# Patient Record
Sex: FEMALE | Race: WHITE | NOT HISPANIC OR LATINO | Employment: FULL TIME | URBAN - METROPOLITAN AREA
[De-identification: names, ages, dates, MRNs, and addresses within clinical notes are randomized per-mention and may not be internally consistent; named-entity substitution may affect disease eponyms.]

---

## 2017-01-18 ENCOUNTER — GENERIC CONVERSION - ENCOUNTER (OUTPATIENT)
Dept: OTHER | Facility: OTHER | Age: 57
End: 2017-01-18

## 2017-02-01 ENCOUNTER — GENERIC CONVERSION - ENCOUNTER (OUTPATIENT)
Dept: OTHER | Facility: OTHER | Age: 57
End: 2017-02-01

## 2017-03-02 ENCOUNTER — ALLSCRIPTS OFFICE VISIT (OUTPATIENT)
Dept: OTHER | Facility: OTHER | Age: 57
End: 2017-03-02

## 2017-03-29 ENCOUNTER — APPOINTMENT (EMERGENCY)
Dept: CT IMAGING | Facility: HOSPITAL | Age: 57
End: 2017-03-29
Payer: COMMERCIAL

## 2017-03-29 ENCOUNTER — HOSPITAL ENCOUNTER (EMERGENCY)
Facility: HOSPITAL | Age: 57
Discharge: HOME | End: 2017-03-29
Admitting: EMERGENCY MEDICINE
Payer: COMMERCIAL

## 2017-03-29 VITALS
HEART RATE: 89 BPM | OXYGEN SATURATION: 98 % | DIASTOLIC BLOOD PRESSURE: 74 MMHG | RESPIRATION RATE: 18 BRPM | SYSTOLIC BLOOD PRESSURE: 134 MMHG | TEMPERATURE: 100.4 F | WEIGHT: 150 LBS

## 2017-03-29 DIAGNOSIS — R11.0 NAUSEA: ICD-10-CM

## 2017-03-29 DIAGNOSIS — R19.7 DIARRHEA: Primary | ICD-10-CM

## 2017-03-29 DIAGNOSIS — R10.9 ABDOMINAL PAIN: ICD-10-CM

## 2017-03-29 LAB
ALBUMIN SERPL BCP-MCNC: 3.6 G/DL (ref 3.5–5)
ALP SERPL-CCNC: 71 U/L (ref 46–116)
ALT SERPL W P-5'-P-CCNC: 24 U/L (ref 12–78)
ANION GAP SERPL CALCULATED.3IONS-SCNC: 9 MMOL/L (ref 4–13)
AST SERPL W P-5'-P-CCNC: 14 U/L (ref 5–45)
BACTERIA UR QL AUTO: ABNORMAL /HPF
BASOPHILS # BLD AUTO: 0.03 THOUSANDS/ΜL (ref 0–0.1)
BASOPHILS NFR BLD AUTO: 0 % (ref 0–1)
BILIRUB SERPL-MCNC: 0.76 MG/DL (ref 0.2–1)
BILIRUB UR QL STRIP: NEGATIVE
BUN SERPL-MCNC: 11 MG/DL (ref 5–25)
CALCIUM SERPL-MCNC: 8.4 MG/DL (ref 8.3–10.1)
CHLORIDE SERPL-SCNC: 103 MMOL/L (ref 100–108)
CLARITY UR: CLEAR
CO2 SERPL-SCNC: 28 MMOL/L (ref 21–32)
COLOR UR: YELLOW
CREAT SERPL-MCNC: 0.78 MG/DL (ref 0.6–1.3)
EOSINOPHIL # BLD AUTO: 0.18 THOUSAND/ΜL (ref 0–0.61)
EOSINOPHIL NFR BLD AUTO: 2 % (ref 0–6)
ERYTHROCYTE [DISTWIDTH] IN BLOOD BY AUTOMATED COUNT: 12.9 % (ref 11.6–15.1)
GFR SERPL CREATININE-BSD FRML MDRD: >60 ML/MIN/1.73SQ M
GLUCOSE SERPL-MCNC: 91 MG/DL (ref 65–140)
GLUCOSE UR STRIP-MCNC: NEGATIVE MG/DL
HCG UR QL: NEGATIVE
HCT VFR BLD AUTO: 38.3 % (ref 34.8–46.1)
HGB BLD-MCNC: 13 G/DL (ref 11.5–15.4)
HGB UR QL STRIP.AUTO: ABNORMAL
KETONES UR STRIP-MCNC: NEGATIVE MG/DL
LACTATE SERPL-SCNC: 0.7 MMOL/L (ref 0.5–2)
LEUKOCYTE ESTERASE UR QL STRIP: NEGATIVE
LIPASE SERPL-CCNC: 47 U/L (ref 73–393)
LYMPHOCYTES # BLD AUTO: 1.9 THOUSANDS/ΜL (ref 0.6–4.47)
LYMPHOCYTES NFR BLD AUTO: 16 % (ref 14–44)
MCH RBC QN AUTO: 31.1 PG (ref 26.8–34.3)
MCHC RBC AUTO-ENTMCNC: 33.9 G/DL (ref 31.4–37.4)
MCV RBC AUTO: 92 FL (ref 82–98)
MONOCYTES # BLD AUTO: 1.08 THOUSAND/ΜL (ref 0.17–1.22)
MONOCYTES NFR BLD AUTO: 9 % (ref 4–12)
NEUTROPHILS # BLD AUTO: 8.7 THOUSANDS/ΜL (ref 1.85–7.62)
NEUTS SEG NFR BLD AUTO: 73 % (ref 43–75)
NITRITE UR QL STRIP: NEGATIVE
NON-SQ EPI CELLS URNS QL MICRO: ABNORMAL /HPF
NRBC BLD AUTO-RTO: 0 /100 WBCS
PH UR STRIP.AUTO: 5.5 [PH] (ref 4.5–8)
PLATELET # BLD AUTO: 281 THOUSANDS/UL (ref 149–390)
PMV BLD AUTO: 11.2 FL (ref 8.9–12.7)
POTASSIUM SERPL-SCNC: 3.7 MMOL/L (ref 3.5–5.3)
PROT SERPL-MCNC: 6.9 G/DL (ref 6.4–8.2)
PROT UR STRIP-MCNC: NEGATIVE MG/DL
RBC # BLD AUTO: 4.18 MILLION/UL (ref 3.81–5.12)
RBC #/AREA URNS AUTO: ABNORMAL /HPF
SODIUM SERPL-SCNC: 140 MMOL/L (ref 136–145)
SP GR UR STRIP.AUTO: 1.01 (ref 1–1.03)
UROBILINOGEN UR QL STRIP.AUTO: 0.2 E.U./DL
WBC # BLD AUTO: 11.89 THOUSAND/UL (ref 4.31–10.16)
WBC #/AREA URNS AUTO: ABNORMAL /HPF

## 2017-03-29 PROCEDURE — 96361 HYDRATE IV INFUSION ADD-ON: CPT

## 2017-03-29 PROCEDURE — 81002 URINALYSIS NONAUTO W/O SCOPE: CPT

## 2017-03-29 PROCEDURE — 85025 COMPLETE CBC W/AUTO DIFF WBC: CPT | Performed by: PHYSICIAN ASSISTANT

## 2017-03-29 PROCEDURE — 87086 URINE CULTURE/COLONY COUNT: CPT

## 2017-03-29 PROCEDURE — 36415 COLL VENOUS BLD VENIPUNCTURE: CPT | Performed by: PHYSICIAN ASSISTANT

## 2017-03-29 PROCEDURE — 80053 COMPREHEN METABOLIC PANEL: CPT | Performed by: PHYSICIAN ASSISTANT

## 2017-03-29 PROCEDURE — 83605 ASSAY OF LACTIC ACID: CPT | Performed by: PHYSICIAN ASSISTANT

## 2017-03-29 PROCEDURE — 83690 ASSAY OF LIPASE: CPT | Performed by: PHYSICIAN ASSISTANT

## 2017-03-29 PROCEDURE — 74177 CT ABD & PELVIS W/CONTRAST: CPT

## 2017-03-29 PROCEDURE — 99284 EMERGENCY DEPT VISIT MOD MDM: CPT

## 2017-03-29 PROCEDURE — 81025 URINE PREGNANCY TEST: CPT

## 2017-03-29 PROCEDURE — 81001 URINALYSIS AUTO W/SCOPE: CPT

## 2017-03-29 PROCEDURE — 96374 THER/PROPH/DIAG INJ IV PUSH: CPT

## 2017-03-29 RX ORDER — DICYCLOMINE HCL 20 MG
20 TABLET ORAL ONCE
Status: COMPLETED | OUTPATIENT
Start: 2017-03-29 | End: 2017-03-29

## 2017-03-29 RX ORDER — DICYCLOMINE HCL 20 MG
20 TABLET ORAL EVERY 6 HOURS
Qty: 30 TABLET | Refills: 0 | Status: SHIPPED | OUTPATIENT
Start: 2017-03-29 | End: 2018-11-09 | Stop reason: HOSPADM

## 2017-03-29 RX ORDER — ONDANSETRON 4 MG/1
4 TABLET, ORALLY DISINTEGRATING ORAL EVERY 8 HOURS PRN
Qty: 15 TABLET | Refills: 0 | Status: SHIPPED | OUTPATIENT
Start: 2017-03-29 | End: 2018-11-09 | Stop reason: HOSPADM

## 2017-03-29 RX ORDER — ONDANSETRON 2 MG/ML
4 INJECTION INTRAMUSCULAR; INTRAVENOUS ONCE
Status: COMPLETED | OUTPATIENT
Start: 2017-03-29 | End: 2017-03-29

## 2017-03-29 RX ADMIN — SODIUM CHLORIDE 1000 ML: 0.9 INJECTION, SOLUTION INTRAVENOUS at 19:06

## 2017-03-29 RX ADMIN — IOHEXOL 100 ML: 350 INJECTION, SOLUTION INTRAVENOUS at 19:57

## 2017-03-29 RX ADMIN — ONDANSETRON 4 MG: 2 INJECTION INTRAMUSCULAR; INTRAVENOUS at 19:07

## 2017-03-29 RX ADMIN — DICYCLOMINE HYDROCHLORIDE 20 MG: 20 TABLET ORAL at 19:03

## 2017-03-30 LAB — BACTERIA UR CULT: NORMAL

## 2017-04-24 ENCOUNTER — GENERIC CONVERSION - ENCOUNTER (OUTPATIENT)
Dept: OTHER | Facility: OTHER | Age: 57
End: 2017-04-24

## 2018-01-10 NOTE — RESULT NOTES
Verified Results  CT ABDOMEN W CONTRAST [id number] 45:62II Andrzej Stevens     Test Name Result Flag Reference   CT ABDOMEN W CONTRAST (Report)     CT ABDOMEN WITH IV CONTRAST     INDICATION: Nausea  Upper abdominal pain and bloating, mainly after eating  COMPARISON: 3/1/2005  TECHNIQUE: CT examination of the abdomen was performed  This examination, like all CT scans performed in the [facility], was performed utilizing techniques to minimize radiation dose exposure, including the use of iterative    reconstruction and automated exposure control  Axial, sagittal, and coronal reformatted images were submitted for interpretation  100 ml of Omnipaque 350 was injected intravenously  Enteric contrast was administered  FINDINGS:     ABDOMEN     LOWER CHEST: No significant abnormality in the lung bases  Unchanged tiny subpleural density laterally at the left base noted on image 2/12, which is benign given stability over a nearly 11 year interval      LIVER/BILIARY TREE: Unremarkable  GALLBLADDER: No calcified gallstones  No pericholecystic inflammatory change  SPLEEN: Unremarkable  PANCREAS: Unremarkable  ADRENAL GLANDS: Unremarkable  KIDNEYS/URETERS: Unremarkable  No hydronephrosis  VISUALIZED STOMACH AND BOWEL: Unremarkable  ABDOMINAL CAVITY: No ascites or free intraperitoneal air  No lymphadenopathy  VESSELS: Unremarkable for patient's age  ABDOMINAL WALL: Unremarkable  OSSEOUS STRUCTURES: No acute fracture or destructive osseous lesion  IMPRESSION:     Normal abdomen  Workstation performed: [id number]     Signed by:   Fidelia Osborn MD   4/29/16     (1) CBC/PLT/DIFF [id number] 60:57XU Andrzej Stevens     Test Name Result Flag Reference   WBC COUNT 7 90 Thousand/uL  4 31-10 16   RBC COUNT 4 31 Million/uL  3 81-5 12   HEMOGLOBIN 13 3 g/dL  11 5-15 4   HEMATOCRIT 39 7 %  34 8-46  1   MCV 92 fL  82-98   MCH 30 9 pg  26 8-34 3   MCHC 33 5 g/dL 31 4-37 4   RDW 13 5 %  11 6-15 1   MPV 12 2 fL  8 9-12 7   PLATELET COUNT 758 Thousands/uL  149-390   nRBC AUTOMATED 0 /100 WBCs     NEUTROPHILS RELATIVE PERCENT 60 %  43-75   LYMPHOCYTES RELATIVE PERCENT 30 %  14-44   MONOCYTES RELATIVE PERCENT 8 %  4-12   EOSINOPHILS RELATIVE PERCENT 1 %  0-6   BASOPHILS RELATIVE PERCENT 1 %  0-1   NEUTROPHILS ABSOLUTE COUNT 4 81 Thousands/µL  1 85-7 62   LYMPHOCYTES ABSOLUTE COUNT 2 33 Thousands/µL  0 60-4 47   MONOCYTES ABSOLUTE COUNT 0 60 Thousand/µL  0 17-1 22   EOSINOPHILS ABSOLUTE COUNT 0 10 Thousand/µL  0 00-0 61   BASOPHILS ABSOLUTE COUNT 0 04 Thousands/µL  0 00-0 10     (1) T3 TOTAL [id number] 08:32WU Razumez     Test Name Result Flag Reference   T3 1 0 ng/mL  0 6-1 8     (1) VITAMIN D 25-HYDROXY [id number] 86:70QQ Singularunz     Test Name Result Flag Reference   VIT D 25-HYDROX 33 1 ng/mL  30 0-100 0     (1) T4, FREE [id number] 16:88QY Singularunz     Test Name Result Flag Reference   T4,FREE 0 77 ng/dL  0 76-1 46     (1) COMPREHENSIVE METABOLIC PANEL [id number] 83:22HP Joya Heaton Kidney Disease Education Program recommendations are as follows:  GFR calculation is accurate only with a steady state creatinine  Chronic Kidney disease less than 60 ml/min/1 73 sq  meters  Kidney failure less than 15 ml/min/1 73 sq  meters  Test Name Result Flag Reference   GLUCOSE,RANDM 94 mg/dL     If the patient is fasting, the ADA then defines impaired fasting glucose as > 100 mg/dL and diabetes as > or equal to 123 mg/dL     SODIUM 137 mmol/L  136-145   POTASSIUM 3 7 mmol/L  3 5-5 3   CHLORIDE 103 mmol/L  100-108   CARBON DIOXIDE 28 mmol/L  21-32   ANION GAP (CALC) 6 mmol/L  4-13   BLOOD UREA NITROGEN 15 mg/dL  5-25   CREATININE 0 69 mg/dL  0 60-1 30   Standardized to IDMS reference method   CALCIUM 8 4 mg/dL  8 3-10 1   BILI, TOTAL 0 65 mg/dL  0 20-1 00   ALK PHOSPHATAS 84 U/L     ALT (SGPT) 34 U/L  12-78   AST(SGOT) 28 U/L  5-45   ALBUMIN 4 0 g/dL  3 5-5 0   TOTAL PROTEIN 7 5 g/dL  6 4-8 2   eGFR Non-African American      >60 0 ml/min/1 73sq m     (1) VITAMIN B12 [id number] 62:92FR Jose Farrell     Test Name Result Flag Reference   VITAMIN B12 415 pg/mL  100-900     (1) TSH [id number] 20:39GA Pryblick, Maryam Mis   Patients undergoing fluorescein dye angiography may retain small amounts of fluorescein in the body for 48-72 hours post procedure  Samples containing fluorescein can produce falsely depressed TSH values  If the patient had this procedure,a specimen should be resubmitted post fluorescein clearance  The recommended reference ranges for TSH during pregnancy are as follows:  First trimester 0 1 to 2 5 uIU/mL  Second trimester  0 2 to 3 0 uIU/mL  Third trimester 0 3 to 3 0 uIU/m     Test Name Result Flag Reference   TSH 2 810 uIU/mL  0 358-3 740     (1) VONNIE SCREEN W/REFLEX TO TITER/PATTERN [id number] 28:22BI Jose Farrell     Test Name Result Flag Reference   VONNIE SCREEN   Negative  Negative     (1) T3 UPTAKE [id number] 10:47AB Jose Farrell   Performed at:  [facility] [address]  [id number]  : Neema Arnold MD, Phone:  [phone]     Test Name Result Flag Reference   T3 UPTAKE 25 %  24 - 39

## 2018-01-13 VITALS
WEIGHT: 153 LBS | SYSTOLIC BLOOD PRESSURE: 114 MMHG | BODY MASS INDEX: 24.01 KG/M2 | TEMPERATURE: 97.9 F | HEIGHT: 67 IN | DIASTOLIC BLOOD PRESSURE: 74 MMHG

## 2018-01-13 NOTE — PROGRESS NOTES
Assessment    1  Acute sinusitis (461 9) (J01 90)   2  Allergic rhinitis (477 9) (J30 9)   3  Cough (786 2) (R05)    Plan  Acute sinusitis, Allergic rhinitis, Cough    · Follow-up PRN Evaluation and Treatment  Follow-up  Status: Complete  Done:  [id number] 02:27PM  Acute sinusitis, Cough    · Azithromycin 500 MG Oral Tablet; TAKE 1 TABLET DAILY UNTIL FINISHED    Discussion/Summary    Mucinex D, Fluticasone and Claritin prn  Call if worse  The patient was counseled regarding  Possible side effects of new medications were reviewed with the patient/guardian today  The treatment plan was reviewed with the patient/guardian  The patient/guardian understands and agrees with the treatment plan      Chief Complaint  PT is here today for a cough and sinus pressure in her left cheek bone into her jaw  Says she had it for a couple days  Also has rattling in her chest       History of Present Illness  HPI: PT is here today for a cough and sinus pressure in her left cheek bone into her jaw  Says she had it for a couple days  Also has rattling in her chest       Review of Systems    Constitutional: No fever, no chills, feels well, no tiredness, no recent weight gain or loss  ENT: as noted in HPI  Cardiovascular: no complaints of slow or fast heart rate, no chest pain, no palpitations, no leg claudication or lower extremity edema  Respiratory: as noted in HPI  Breasts: no complaints of breast pain, breast lump or nipple discharge  Gastrointestinal: no complaints of abdominal pain, no constipation, no nausea or diarrhea, no vomiting, no bloody stools  Genitourinary: no complaints of dysuria, no incontinence, no pelvic pain, no dysmenorrhea, no vaginal discharge or abnormal vaginal bleeding  Musculoskeletal: no complaints of arthralgia, no myalgia, no joint swelling or stiffness, no limb pain or swelling  Integumentary: no complaints of skin rash or lesion, no itching or dry skin, no skin wounds     Neurological: no complaints of headache, no confusion, no numbness or tingling, no dizziness or fainting  Active Problems    1  Acute sinusitis (461 9) (J01 90)   2  Allergic rhinitis (477 9) (J30 9)   3  Arrhythmia (427 9) (I49 9)   4  Blood pressure elevated (796 2) (I10)   5  Colon cancer screening (V76 51) (Z12 11)   6  Dyslipidemia (272 4) (E78 5)   7  Engages in travel abroad (V49 89) (Z78 9)   8  Esophageal reflux (530 81) (K21 9)   9  History of allergy (V15 09) (Z88 9)   10  Hyperkalemia (276 7) (E87 5)   11  Hypertension (401 9) (I10)   12  Low back pain (724 2) (M54 5)   13  Lumbar radiculopathy (724 4) (M54 16)   14  Osteoarthritis (715 90) (M19 90)   15  Pulmonary embolism (415 19) (I26 99)   16  Supraventricular tachycardia (427 89) (I47 1)   17  Travelers' diarrhea (009 2) (A09)   18  Vitamin D deficiency (268 9) (E55 9)    Past Medical History    1  History of Acute Erosive Gastritis (535 40)   2  History of Blepharitis of left eye (373 00) (H01 006)   3  History of Cough (786 2) (R05)   4  History of nausea (V12 79) (Z87 898)   5  History of sinusitis (V12 69) (Z87 09)   6  History of upper respiratory infection (V12 09) (Z87 09)   7  History of Sore throat (462) (J02 9)    Family History    1  Family history of Essential Hypertension    2  Family history of Esophageal Cancer (V16 0)   3  Family history of Essential Hypertension   4  Family history of Stroke Syndrome (V17 1)    5  Family history of Benign Polyps Of The Large Intestine (V18 51)    Social History    · Being A Social Drinker   · Engages in travel abroad (V49 89) (Z78 9)   · Never A Smoker    Surgical History    1  History of  Section   2  History of Complete Colonoscopy   3  History of Complete Colonoscopy   4  History of Diagnostic Esophagogastroduodenoscopy   5  History of Venous Ligation With Stripping    Current Meds   1  Aspirin 81 MG Oral Tablet; Therapy: (Recorded:39Hsy6787) to Recorded   2   Fluticasone Propionate 50 MCG/ACT Nasal Suspension; USE 2 SPRAYS IN EACH   NOSTRIL ONCE DAILY; Therapy: 00XNH9095 to (Jimmy Atkinson)  Requested for: 43ENK8910; Last   Rx:04Jan2016 Ordered   3  Multiple Vitamins Oral Tablet; TAKE 1 TABLET DAILY; Therapy: [id number] to Recorded   4  Vitamin D3 TABS; Therapy: (Recorded:31Ita2422) to Recorded    Allergies    1  Penicillins   2  promethazine    Vitals   Recorded: 26UMJ9826 97:61VN   Systolic 410   Diastolic 80   Weight 480 lb 6 08 oz     Physical Exam    Constitutional   General appearance: No acute distress, well appearing and well nourished  Eyes   Conjunctiva and lids: No swelling, erythema or discharge  Pupils and irises: Equal, round and reactive to light  Ears, Nose, Mouth, and Throat   External inspection of ears and nose: Normal     Otoscopic examination: Tympanic membranes translucent with normal light reflex  Canals patent without erythema  Nasal mucosa, septum, and turbinates: Abnormal   sinus congestion  Oropharynx: Abnormal   pnd    Pulmonary   Respiratory effort: No increased work of breathing or signs of respiratory distress  Auscultation of lungs: Abnormal   cough  Cardiovascular   Palpation of heart: Normal PMI, no thrills  Auscultation of heart: Normal rate and rhythm, normal S1 and S2, without murmurs  Examination of extremities for edema and/or varicosities: Normal     Carotid pulses: Normal     Abdomen   Abdomen: Non-tender, no masses  Liver and spleen: No hepatomegaly or splenomegaly  Lymphatic   Palpation of lymph nodes in neck: No lymphadenopathy  Musculoskeletal   Gait and station: Normal     Digits and nails: Normal without clubbing or cyanosis  Inspection/palpation of joints, bones, and muscles: Normal     Skin   Skin and subcutaneous tissue: Normal without rashes or lesions  Neurologic   Cranial nerves: Cranial nerves 2-12 intact  Reflexes: 2+ and symmetric  Sensation: No sensory loss      Psychiatric   Orientation to person, place, and time: Normal     Mood and affect: Normal          Signatures   Electronically signed by : Chun Kenney DO; Feb 1 2016  2:28PM EST                       (Author)

## 2018-01-19 ENCOUNTER — GENERIC CONVERSION - ENCOUNTER (OUTPATIENT)
Dept: OTHER | Facility: OTHER | Age: 58
End: 2018-01-19

## 2018-01-19 DIAGNOSIS — M25.561 PAIN IN RIGHT KNEE: ICD-10-CM

## 2018-01-19 DIAGNOSIS — M25.50 PAIN IN JOINT: ICD-10-CM

## 2018-01-19 DIAGNOSIS — M25.469 EFFUSION OF KNEE: ICD-10-CM

## 2018-01-20 LAB
A/G RATIO (HISTORICAL): 1.7 (ref 1.2–2.2)
ALBUMIN SERPL BCP-MCNC: 4.7 G/DL (ref 3.5–5.5)
ALP SERPL-CCNC: 92 IU/L (ref 39–117)
ALT SERPL W P-5'-P-CCNC: 24 IU/L (ref 0–32)
ANTINUCLEAR ANTIBODIES, IFA (HISTORICAL): NEGATIVE
AST SERPL W P-5'-P-CCNC: 20 IU/L (ref 0–40)
BASOPHILS # BLD AUTO: 0.1 X10E3/UL (ref 0–0.2)
BASOPHILS # BLD AUTO: 1 %
BILIRUB SERPL-MCNC: 0.4 MG/DL (ref 0–1.2)
BUN SERPL-MCNC: 17 MG/DL (ref 6–24)
BUN/CREA RATIO (HISTORICAL): 23 (ref 9–23)
CALCIUM SERPL-MCNC: 9.7 MG/DL (ref 8.7–10.2)
CHLORIDE SERPL-SCNC: 98 MMOL/L (ref 96–106)
CO2 SERPL-SCNC: 25 MMOL/L (ref 18–29)
CREAT SERPL-MCNC: 0.73 MG/DL (ref 0.57–1)
DEPRECATED RDW RBC AUTO: 13.3 % (ref 12.3–15.4)
EGFR AFRICAN AMERICAN (HISTORICAL): 107 ML/MIN/1.73
EGFR-AMERICAN CALC (HISTORICAL): 93 ML/MIN/1.73
EOSINOPHIL # BLD AUTO: 0.1 X10E3/UL (ref 0–0.4)
EOSINOPHIL # BLD AUTO: 1 %
ERYTHROCYTE SEDIMENTATION RATE (HISTORICAL): 2 MM/HR (ref 0–40)
GLUCOSE SERPL-MCNC: 87 MG/DL (ref 65–99)
HCT VFR BLD AUTO: 42.2 % (ref 34–46.6)
HGB BLD-MCNC: 13.8 G/DL (ref 11.1–15.9)
IMM.GRANULOCYTES (CD4/8) (HISTORICAL): 0 %
IMM.GRANULOCYTES (CD4/8) (HISTORICAL): 0 X10E3/UL (ref 0–0.1)
LYMPHOCYTES # BLD AUTO: 2.8 X10E3/UL (ref 0.7–3.1)
LYMPHOCYTES # BLD AUTO: 36 %
MCH RBC QN AUTO: 29.9 PG (ref 26.6–33)
MCHC RBC AUTO-ENTMCNC: 32.7 G/DL (ref 31.5–35.7)
MCV RBC AUTO: 92 FL (ref 79–97)
MONOCYTES # BLD AUTO: 0.6 X10E3/UL (ref 0.1–0.9)
MONOCYTES (HISTORICAL): 8 %
NEUTROPHILS # BLD AUTO: 4.3 X10E3/UL (ref 1.4–7)
NEUTROPHILS # BLD AUTO: 54 %
PLATELET # BLD AUTO: 311 X10E3/UL (ref 150–379)
PLEASE NOTE: (HISTORICAL): NORMAL
POTASSIUM SERPL-SCNC: 4.4 MMOL/L (ref 3.5–5.2)
RA LATEX TURBID (HISTORICAL): <10 IU/ML (ref 0–13.9)
RBC (HISTORICAL): 4.61 X10E6/UL (ref 3.77–5.28)
SODIUM SERPL-SCNC: 140 MMOL/L (ref 134–144)
TOT. GLOBULIN, SERUM (HISTORICAL): 2.7 G/DL (ref 1.5–4.5)
TOTAL PROTEIN (HISTORICAL): 7.4 G/DL (ref 6–8.5)
WBC # BLD AUTO: 7.8 X10E3/UL (ref 3.4–10.8)

## 2018-01-23 ENCOUNTER — GENERIC CONVERSION - ENCOUNTER (OUTPATIENT)
Dept: OTHER | Facility: OTHER | Age: 58
End: 2018-01-23

## 2018-01-23 LAB
LYME 18 KD IGG (HISTORICAL): ABNORMAL
LYME 23 KD IGG (HISTORICAL): ABNORMAL
LYME 23 KD IGM (HISTORICAL): ABNORMAL
LYME 28 KD IGG (HISTORICAL): ABNORMAL
LYME 30 KD IGG (HISTORICAL): ABNORMAL
LYME 39 KD IGG (HISTORICAL): ABNORMAL
LYME 39 KD IGM (HISTORICAL): ABNORMAL
LYME 41 KD IGG (HISTORICAL): ABNORMAL
LYME 41 KD IGM (HISTORICAL): ABNORMAL
LYME 45 KD IGG (HISTORICAL): PRESENT
LYME 58 KD IGG (HISTORICAL): ABNORMAL
LYME 66 KD IGG (HISTORICAL): ABNORMAL
LYME 93 KD IGG (HISTORICAL): ABNORMAL
LYME IGG WB INTERP. (HISTORICAL): NEGATIVE
LYME IGG/IGM AB (HISTORICAL): <0.91 ISR (ref 0–0.9)
LYME IGM (HISTORICAL): 0.84 INDEX (ref 0–0.79)
LYME IGM WB INTERP. (HISTORICAL): NEGATIVE

## 2018-01-24 VITALS
SYSTOLIC BLOOD PRESSURE: 128 MMHG | WEIGHT: 153 LBS | DIASTOLIC BLOOD PRESSURE: 76 MMHG | BODY MASS INDEX: 24.01 KG/M2 | HEIGHT: 67 IN

## 2018-01-24 NOTE — RESULT NOTES
Verified Results  [facility]) Sedimentation Rate-Joshuaergren [id number] 09:89BQ [facility]     Test Name Result Flag Reference   Sedimentation Rate-Westergren 2 mm/hr  0-40     (1) CBC/PLT/DIFF [id number] 68:43CB [facility]     Test Name Result Flag Reference   WBC 7 8 x10E3/uL  3 4-10 8   RBC 4 61 x10E6/uL  3 77-5 28   Hemoglobin 13 8 g/dL  11 1-15 9   Hematocrit 42 2 %  34 0-46  6   MCV 92 fL  79-97   MCH 29 9 pg  26 6-33 0   MCHC 32 7 g/dL  31 5-35 7   RDW 13 3 %  12 3-15 4   Platelets 867 K81S5/PJ  150-379   Neutrophils 54 %  Not Estab  Lymphs 36 %  Not Estab  Monocytes 8 %  Not Estab  Eos 1 %  Not Estab  Basos 1 %  Not Estab  Neutrophils (Absolute) 4 3 x10E3/uL  1 4-7 0   Lymphs (Absolute) 2 8 x10E3/uL  0 7-3 1   Monocytes(Absolute) 0 6 x10E3/uL  0 1-0 9   Eos (Absolute) 0 1 x10E3/uL  0 0-0 4   Baso (Absolute) 0 1 x10E3/uL  0 0-0 2   Immature Granulocytes 0 %  Not Estab  Immature Grans (Abs) 0 0 x10E3/uL  0 0-0 1     (1) COMPREHENSIVE METABOLIC PANEL [id number] 24:72AF [employer]     Test Name Result Flag Reference   Glucose, Serum 87 mg/dL  65-99   BUN 17 mg/dL  6-24   Creatinine, Serum 0 73 mg/dL  0 57-1 00   BUN/Creatinine Ratio 23  9-23   Sodium, Serum 140 mmol/L  134-144   Potassium, Serum 4 4 mmol/L  3 5-5 2   Chloride, Serum 98 mmol/L     Carbon Dioxide, Total 25 mmol/L  18-29   Calcium, Serum 9 7 mg/dL  8 7-10 2   Protein, Total, Serum 7 4 g/dL  6 0-8 5   Albumin, Serum 4 7 g/dL  3 5-5 5   Globulin, Total 2 7 g/dL  1 5-4 5   A/G Ratio 1 7  1 2-2 2   Bilirubin, Total 0 4 mg/dL  0 0-1 2   Alkaline Phosphatase, S 92 IU/L     AST (SGOT) 20 IU/L  0-40   ALT (SGPT) 24 IU/L  0-32   eGFR If NonAfricn Am 93 mL/min/1 73  >59   eGFR If Africn Am 107 mL/min/1 73  >59     (LC) Rheumatoid Arthritis Factor [id number] 43:54QG [employer]     Test Name Result Flag Reference   RA Latex Turbid   <10 0 IU/mL  0 0-13 9     ([facility]) VONNIE [id number] 71:51GU [employer]     Test Name Result Flag Reference   Antinuclear Antibodies, IFA Negative     Negative   <1:80                                                      Borderline  1:80                                                      Positive   >1:80   Please Note:      VONNIE Multiplex methodology was designed to detect up to 11 antibodies  of the 100+ antibodies that may be detected by VONNIE IFA methodology  VONNIE Multiplex methodology was designed to detect up to 11 antibodies  of the 100+ antibodies that may be detected by VONNIE IFA methodology

## 2018-02-07 ENCOUNTER — OFFICE VISIT (OUTPATIENT)
Dept: FAMILY MEDICINE CLINIC | Facility: CLINIC | Age: 58
End: 2018-02-07
Payer: COMMERCIAL

## 2018-02-07 VITALS
WEIGHT: 154.6 LBS | DIASTOLIC BLOOD PRESSURE: 76 MMHG | TEMPERATURE: 100.1 F | BODY MASS INDEX: 24.27 KG/M2 | SYSTOLIC BLOOD PRESSURE: 118 MMHG | HEIGHT: 67 IN

## 2018-02-07 DIAGNOSIS — Z20.828 EXPOSURE TO INFLUENZA: Primary | ICD-10-CM

## 2018-02-07 PROBLEM — J11.1 INFLUENZA: Status: ACTIVE | Noted: 2018-02-07

## 2018-02-07 PROCEDURE — 3008F BODY MASS INDEX DOCD: CPT | Performed by: FAMILY MEDICINE

## 2018-02-07 PROCEDURE — 99213 OFFICE O/P EST LOW 20 MIN: CPT | Performed by: FAMILY MEDICINE

## 2018-02-07 RX ORDER — FEXOFENADINE HCL 180 MG/1
1 TABLET ORAL DAILY
COMMUNITY

## 2018-02-07 RX ORDER — HYDROCODONE POLISTIREX AND CHLORPHENIRAMINE POLISTIREX 10; 8 MG/5ML; MG/5ML
5 SUSPENSION, EXTENDED RELEASE ORAL EVERY 12 HOURS PRN
Qty: 120 ML | Refills: 0 | Status: SHIPPED | OUTPATIENT
Start: 2018-02-07 | End: 2018-11-09 | Stop reason: HOSPADM

## 2018-02-07 RX ORDER — FLUTICASONE PROPIONATE 50 MCG
2 SPRAY, SUSPENSION (ML) NASAL DAILY
COMMUNITY
Start: 2011-10-03 | End: 2018-03-22 | Stop reason: SDUPTHER

## 2018-02-07 RX ORDER — OSELTAMIVIR PHOSPHATE 75 MG/1
75 CAPSULE ORAL 2 TIMES DAILY
Qty: 10 CAPSULE | Refills: 0 | Status: SHIPPED | OUTPATIENT
Start: 2018-02-07 | End: 2018-02-12

## 2018-02-07 NOTE — PROGRESS NOTES
Assessment/Plan:    Influenza  Pt w fever, myalgias and fatigue, Treat as FLU  RX Tamiflu  Diagnoses and all orders for this visit:    Exposure to influenza  -     oseltamivir (TAMIFLU) 75 mg capsule; Take 1 capsule (75 mg total) by mouth 2 (two) times a day for 5 days  -     hydrocodone-chlorpheniramine polistirex (TUSSIONEX) 10-8 mg/5 mL ER suspension; Take 5 mL by mouth every 12 (twelve) hours as needed for cough Max Daily Amount: 10 mL    Other orders  -     fexofenadine (ALLEGRA ALLERGY) 180 MG tablet; Take 1 capsule by mouth daily  -     ASPIRIN 81 PO; Take by mouth  -     Cholecalciferol 5000 units capsule; Take by mouth  -     fluticasone (FLONASE) 50 mcg/act nasal spray; 2 sprays into each nostril daily  -     DAILY MULTIPLE VITAMINS PO; Take 1 tablet by mouth daily        There are no Patient Instructions on file for this visit  Subjective:        Patient ID: Obdulio Velazquez is a 56 y o  female  Chief Complaint   Patient presents with    Chills    Cough    Generalized Body Aches       Flu like sympt < 48 hrs  Review of Systems   Constitutional: Positive for chills, fatigue and fever  HENT: Positive for congestion  Respiratory: Positive for cough  Gastrointestinal: Negative for abdominal pain  Musculoskeletal: Positive for myalgias  Objective:  /76 (BP Location: Left arm, Patient Position: Sitting, Cuff Size: Standard)   Temp 100 1 °F (37 8 °C)   Ht 5' 6 5" (1 689 m)   Wt 70 1 kg (154 lb 9 6 oz)   BMI 24 58 kg/m²        Physical Exam   Constitutional: She is oriented to person, place, and time  Looks tired   HENT:   Head: Normocephalic and atraumatic  Right Ear: External ear normal    Left Ear: External ear normal    Nose: Nose normal    Mouth/Throat: Oropharynx is clear and moist  No oropharyngeal exudate  Eyes: Pupils are equal, round, and reactive to light  Cardiovascular: Normal rate and regular rhythm      Pulmonary/Chest: Effort normal  Clear w sl coarse cough   Abdominal: There is no tenderness  Musculoskeletal: She exhibits no edema  Lymphadenopathy:     She has no cervical adenopathy  Neurological: She is alert and oriented to person, place, and time  Skin: Skin is warm

## 2018-02-07 NOTE — PATIENT INSTRUCTIONS
Influenza   AMBULATORY CARE:   Influenza  (the flu) is an infection caused by the influenza virus  The flu is easily spread when an infected person coughs, sneezes, or has close contact with others  You may be able to spread the flu to others for 1 week or longer after signs or symptoms appear  Common signs and symptoms include the following:   · Fever and chills    · Headaches, body aches, and muscle or joint pain    · Cough, runny nose, and sore throat    · Loss of appetite, nausea, vomiting, or diarrhea    · Tiredness    · Trouble breathing  Call 911 for any of the following:   · You have trouble breathing, and your lips look purple or blue  · You have a seizure  Seek care immediately if:   · You are dizzy, or you are urinating less or not at all  · You have a headache with a stiff neck, and you feel tired or confused  · You have new pain or pressure in your chest     · Your symptoms, such as shortness of breath, vomiting, or diarrhea, get worse  · Your symptoms, such as fever and coughing, seem to get better, but then get worse  Contact your healthcare provider if:   · You have new muscle pain or weakness  · You have questions or concerns about your condition or care  Treatment for influenza  may include any of the following:  · Acetaminophen  decreases pain and fever  It is available without a doctor's order  Ask how much to take and how often to take it  Follow directions  Acetaminophen can cause liver damage if not taken correctly  · NSAIDs , such as ibuprofen, help decrease swelling, pain, and fever  This medicine is available with or without a doctor's order  NSAIDs can cause stomach bleeding or kidney problems in certain people  If you take blood thinner medicine, always ask your healthcare provider if NSAIDs are safe for you  Always read the medicine label and follow directions  · Antivirals  help fight a viral infection    Manage your symptoms:   · Rest  as much as you can to help you recover  · Drink liquids as directed  to help prevent dehydration  Ask how much liquid to drink each day and which liquids are best for you  Prevent the spread of the flu:   · Wash your hands often  Use soap and water  Wash your hands after you use the bathroom, change a child's diapers, or sneeze  Wash your hands before you prepare or eat food  Use gel hand cleanser when soap and water are not available  Do not touch your eyes, nose, or mouth unless you have washed your hands first            · Cover your mouth when you sneeze or cough  Cough into a tissue or the bend of your arm  · Clean shared items with a germ-killing   Clean table surfaces, doorknobs, and light switches  Do not share towels, silverware, and dishes with people who are sick  Wash bed sheets, towels, silverware, and dishes with soap and water  · Wear a mask  over your mouth and nose if you are sick or are near anyone who is sick  · Stay away from others  if you are sick  · Influenza vaccine  helps prevent influenza (flu)  Everyone older than 6 months should get a yearly influenza vaccine  Get the vaccine as soon as it is available, usually in September or October each year  Follow up with your healthcare provider as directed:  Write down your questions so you remember to ask them during your visits  © 2017 [address] [employer] Information is for End User's use only and may not be sold, redistributed or otherwise used for commercial purposes  All illustrations and images included in CareNotes® are the copyrighted property of A D A M , Inc  or Reagan Dawson  The above information is an  only  It is not intended as medical advice for individual conditions or treatments  Talk to your doctor, nurse or pharmacist before following any medical regimen to see if it is safe and effective for you

## 2018-02-07 NOTE — LETTER
February 7, 2018     Patient: Saurav Hardy   YOB: 1960   Date of Visit: 2/7/2018       To Whom it May Concern:    Acostalizette Arenas is under my professional care  She was seen in my office on 2/7/2018  She may return to work on 02/12/2018  Please also excuse her 02/07/2018, 02/08/2018, and 02/09/2018  Patient has influenza  If you have any questions or concerns, please don't hesitate to call           Sincerely,          Chantal Corona, DO        CC: No Recipients

## 2018-03-22 DIAGNOSIS — J30.2 SEASONAL ALLERGIC RHINITIS, UNSPECIFIED CHRONICITY, UNSPECIFIED TRIGGER: Primary | ICD-10-CM

## 2018-03-22 RX ORDER — FLUTICASONE PROPIONATE 50 MCG
SPRAY, SUSPENSION (ML) NASAL
Qty: 16 G | Refills: 0 | Status: SHIPPED | OUTPATIENT
Start: 2018-03-22 | End: 2018-04-05 | Stop reason: SDUPTHER

## 2018-04-05 DIAGNOSIS — J30.2 SEASONAL ALLERGIC RHINITIS, UNSPECIFIED CHRONICITY, UNSPECIFIED TRIGGER: ICD-10-CM

## 2018-04-06 RX ORDER — FLUTICASONE PROPIONATE 50 MCG
SPRAY, SUSPENSION (ML) NASAL
Qty: 16 G | Refills: 0 | Status: SHIPPED | OUTPATIENT
Start: 2018-04-06 | End: 2018-05-15 | Stop reason: SDUPTHER

## 2018-05-15 DIAGNOSIS — J30.2 SEASONAL ALLERGIC RHINITIS: ICD-10-CM

## 2018-05-15 RX ORDER — FLUTICASONE PROPIONATE 50 MCG
SPRAY, SUSPENSION (ML) NASAL
Qty: 16 G | Refills: 0 | Status: SHIPPED | OUTPATIENT
Start: 2018-05-15 | End: 2018-06-14

## 2018-06-14 DIAGNOSIS — J30.2 SEASONAL ALLERGIC RHINITIS: ICD-10-CM

## 2018-06-14 RX ORDER — FLUTICASONE PROPIONATE 50 MCG
SPRAY, SUSPENSION (ML) NASAL
Qty: 16 G | Refills: 0 | Status: SHIPPED | OUTPATIENT
Start: 2018-06-14 | End: 2018-07-14 | Stop reason: SDUPTHER

## 2018-07-14 DIAGNOSIS — J30.2 SEASONAL ALLERGIC RHINITIS: ICD-10-CM

## 2018-07-15 RX ORDER — FLUTICASONE PROPIONATE 50 MCG
SPRAY, SUSPENSION (ML) NASAL
Qty: 16 G | Refills: 0 | Status: SHIPPED | OUTPATIENT
Start: 2018-07-15 | End: 2018-08-19 | Stop reason: SDUPTHER

## 2018-08-13 DIAGNOSIS — J30.2 SEASONAL ALLERGIC RHINITIS: ICD-10-CM

## 2018-08-13 RX ORDER — FLUTICASONE PROPIONATE 50 MCG
SPRAY, SUSPENSION (ML) NASAL
Qty: 16 G | Refills: 0 | OUTPATIENT
Start: 2018-08-13

## 2018-08-19 DIAGNOSIS — J30.2 SEASONAL ALLERGIC RHINITIS, UNSPECIFIED TRIGGER: ICD-10-CM

## 2018-08-19 RX ORDER — FLUTICASONE PROPIONATE 50 MCG
2 SPRAY, SUSPENSION (ML) NASAL DAILY
Qty: 16 G | Refills: 5 | Status: SHIPPED | OUTPATIENT
Start: 2018-08-19 | End: 2018-09-12 | Stop reason: SDUPTHER

## 2018-09-12 DIAGNOSIS — J30.2 SEASONAL ALLERGIC RHINITIS, UNSPECIFIED TRIGGER: ICD-10-CM

## 2018-09-12 DIAGNOSIS — J30.2 SEASONAL ALLERGIC RHINITIS: ICD-10-CM

## 2018-09-12 RX ORDER — FLUTICASONE PROPIONATE 50 MCG
2 SPRAY, SUSPENSION (ML) NASAL DAILY
Qty: 16 G | Refills: 5 | Status: SHIPPED | OUTPATIENT
Start: 2018-09-12 | End: 2018-10-12

## 2018-09-12 RX ORDER — FLUTICASONE PROPIONATE 50 MCG
SPRAY, SUSPENSION (ML) NASAL
Qty: 16 G | Refills: 0 | OUTPATIENT
Start: 2018-09-12

## 2018-10-12 DIAGNOSIS — J30.2 SEASONAL ALLERGIC RHINITIS: ICD-10-CM

## 2018-10-12 RX ORDER — FLUTICASONE PROPIONATE 50 MCG
SPRAY, SUSPENSION (ML) NASAL
Qty: 16 G | Refills: 0 | Status: SHIPPED | OUTPATIENT
Start: 2018-10-12 | End: 2019-11-22 | Stop reason: SDUPTHER

## 2018-11-09 ENCOUNTER — OFFICE VISIT (OUTPATIENT)
Dept: URGENT CARE | Facility: MEDICAL CENTER | Age: 58
End: 2018-11-09
Payer: COMMERCIAL

## 2018-11-09 VITALS
SYSTOLIC BLOOD PRESSURE: 133 MMHG | DIASTOLIC BLOOD PRESSURE: 81 MMHG | RESPIRATION RATE: 16 BRPM | HEIGHT: 67 IN | BODY MASS INDEX: 24.58 KG/M2 | HEART RATE: 74 BPM | OXYGEN SATURATION: 98 % | TEMPERATURE: 98 F

## 2018-11-09 DIAGNOSIS — L03.012 PARONYCHIA OF FINGER OF LEFT HAND: Primary | ICD-10-CM

## 2018-11-09 PROCEDURE — 10060 I&D ABSCESS SIMPLE/SINGLE: CPT | Performed by: PHYSICIAN ASSISTANT

## 2018-11-09 RX ORDER — CEPHALEXIN 500 MG/1
1000 CAPSULE ORAL EVERY 12 HOURS SCHEDULED
Qty: 28 CAPSULE | Refills: 0 | Status: SHIPPED | OUTPATIENT
Start: 2018-11-09 | End: 2018-11-16

## 2018-11-09 NOTE — PROGRESS NOTES
[facility]        NAME: Daryle Nao is a 66 y o  female  : 1960    MRN: [id number]  DATE: 2018  TIME: 11:38 AM    Assessment and Plan   Paronychia of finger of left hand [L03 012]  1  Paronychia of finger of left hand  cephalexin (KEFLEX) 500 mg capsule         Patient Instructions       Follow up with PCP in 3-5 days  Proceed to  ER if symptoms worsen  Chief Complaint     Chief Complaint   Patient presents with    Hand Pain     Patient relates had a manicure done 1 1/2 week ago, now has pain and swelling around nailbed  Denies fever  Denies chills  History of Present Illness       Patient complains of paronychia of the left index finger there is swelling redness over the last 2 days and had been increasing  Hand Pain          Review of Systems   Review of Systems   All other systems reviewed and are negative  Current Medications       Current Outpatient Prescriptions:     ASPIRIN 81 PO, Take by mouth, Disp: , Rfl:     Cholecalciferol 5000 units capsule, Take by mouth, Disp: , Rfl:     DAILY MULTIPLE VITAMINS PO, Take 1 tablet by mouth daily, Disp: , Rfl:     fexofenadine (ALLEGRA ALLERGY) 180 MG tablet, Take 1 capsule by mouth daily, Disp: , Rfl:     fluticasone (FLONASE) 50 mcg/act nasal spray, use 2 sprays in each nostril daily   , Disp: 16 g, Rfl: 0    cephalexin (KEFLEX) 500 mg capsule, Take 2 capsules (1,000 mg total) by mouth every 12 (twelve) hours for 7 days, Disp: 28 capsule, Rfl: 0    Current Allergies     Allergies as of 2018 - Reviewed 2018   Allergen Reaction Noted    Penicillins  2017    Phenergan [promethazine]  2017            The following portions of the patient's history were reviewed and updated as appropriate: allergies, current medications, past family history, past medical history, past social history, past surgical history and problem list      Past Medical History:   Diagnosis Date    Cardiac arrhythmia     resolved 2016    GERD (gastroesophageal reflux disease)     Hyperkalemia     resolved 2016    Pulmonary embolism ([address] )     resolved 2016    Supraventricular tachycardia ([facility] [address] )     esolved 2016       Past Surgical History:   Procedure Laterality Date     SECTION      COLONOSCOPY      ESOPHAGOGASTRODUODENOSCOPY      2014    TONSILLECTOMY      VEIN LIGATION      with stripping       Family History   Problem Relation Age of Onset    Hypothyroidism Mother     Hypertension Mother     Hypothyroidism Sister     Other Sister         benign polyps of the large intestine     Hypothyroidism Maternal Grandmother     Hypothyroidism Paternal Grandmother     Esophageal cancer Father     Hypertension Father     Stroke Father          Medications have been verified  Objective   /81   Pulse 74   Temp 98 °F (36 7 °C) (Tympanic)   Resp 16   Ht 5' 6 5" (1 689 m)   SpO2 98%   BMI 24 58 kg/m²        Physical Exam     Physical Exam   Constitutional: She appears well-developed and well-nourished  Nursing note and vitals reviewed      Left index finger paronychia

## 2018-11-09 NOTE — PROGRESS NOTES
Incision and drain  Date/Time: 11/9/2018 11:39 AM  Performed by: Krystal Martinez by: Carina Rizvi     Patient location:  Clinic  Consent:     Consent obtained:  Verbal    Consent given by:  Patient    Risks discussed:  Infection, incomplete drainage and bleeding    Alternatives discussed:  Alternative treatment  Universal protocol:     Procedure explained and questions answered to patient or proxy's satisfaction: yes      Immediately prior to procedure a time out was called: yes      Patient identity confirmed:  Verbally with patient  Location:     Type:  Abscess    Size:  Paronychia    Location:  Upper extremity    Upper extremity location:  L index finger  Pre-procedure details:     Skin preparation:  Antiseptic wash  Anesthesia (see MAR for exact dosages): Anesthesia method:  None  Procedure details:     Complexity:  Simple    Needle aspiration: no      Incision types:  Single straight    Approach:  Open    Incision depth:  Subcutaneous    Wound management:  Probed and deloculated    Drainage:  Serosanguinous    Drainage amount:  Scant    Wound treatment:  Wound left open    Packing materials:  None  Post-procedure details:     Patient tolerance of procedure:   Tolerated well, no immediate complications

## 2018-11-09 NOTE — PATIENT INSTRUCTIONS
Paronychia   WHAT YOU NEED TO KNOW:   Paronychia is an infection of your nail fold caused by bacteria or a fungus  The nail fold is the skin around your nail  Paronychia may happen suddenly and last for 6 weeks or longer  You may have paronychia on more than 1 finger or toe  DISCHARGE INSTRUCTIONS:   Medicines:   · Td vaccine  is a booster shot used to help prevent tetanus and diphtheria  The Td booster may be given to adolescents and adults every 10 years or for certain wounds and injuries  · Antibiotics: This medicine will help fight or prevent an infection  It may be given as a pill, cream, or ointment  · Steroids: This medicine will help decrease inflammation  It may be given as a pill, cream, or ointment  · Antifungal medicine: This medicine helps kill fungus that may be causing your infection  It may be given as a cream or ointment  · NSAIDs:  These medicines decrease pain and swelling  NSAIDs are available without a doctor's order  Ask your healthcare provider which medicine is right for you  Ask how much to take and when to take it  Take as directed  NSAIDs can cause stomach bleeding and kidney problems if not taken correctly  · Take your medicine as directed  Contact your healthcare provider if you think your medicine is not helping or if you have side effects  Tell him of her if you are allergic to any medicine  Keep a list of the medicines, vitamins, and herbs you take  Include the amounts, and when and why you take them  Bring the list or the pill bottles to follow-up visits  Carry your medicine list with you in case of an emergency  Follow up with your healthcare provider as directed:  Write down your questions so you remember to ask them during your visits  Self-care:   · Soak your nail:  Soak your nail in a mixture of equal parts vinegar and water 3 or 4 times each day  This will help decrease inflammation      · Apply a warm compress:  Soak a washcloth in warm water and place it on your nail  This will help decrease inflammation  · Elevate:  Raise your nail above the level of your heart as often as you can  This will help decrease swelling and pain  Prop your nail on pillows or blankets to keep it elevated comfortably  · Use lotion:  Apply lotion after you wash your hands  This will prevent your skin from becoming too dry  Prevent paronychia:   · Avoid chemicals and allergens that may harm your skin and nails  This includes soaps, laundry detergents, and nail products  · Keep your nails clean and dry  Avoid soaking your nails in water  Use cotton-lined rubber gloves or wear 2 rubber gloves if you work with food or water  The gloves will help protect your nail folds  · Keep your nails short  Do not bite your nails, pick at your hangnails, suck your fingers, or wear fake nails  Bring your own nail tools when you go to the nail salon  Contact your healthcare provider if:   · Your nail becomes loose, deformed, or falls off  · You have a large abscess on your nail  · You have questions or concerns about your condition or care  Return to the emergency department if:   · You have severe nail pain  · The inflammation spreads to your hand or arm  © 2017 [facility] [facility] Information is for End User's use only and may not be sold, redistributed or otherwise used for commercial purposes  All illustrations and images included in CareNotes® are the copyrighted property of [employer]  or Reagan Dawson  The above information is an  only  It is not intended as medical advice for individual conditions or treatments  Talk to your doctor, nurse or pharmacist before following any medical regimen to see if it is safe and effective for you

## 2018-11-30 ENCOUNTER — OFFICE VISIT (OUTPATIENT)
Dept: FAMILY MEDICINE CLINIC | Facility: CLINIC | Age: 58
End: 2018-11-30
Payer: COMMERCIAL

## 2018-11-30 VITALS
BODY MASS INDEX: 23.48 KG/M2 | SYSTOLIC BLOOD PRESSURE: 140 MMHG | DIASTOLIC BLOOD PRESSURE: 78 MMHG | WEIGHT: 149.6 LBS | HEIGHT: 67 IN

## 2018-11-30 DIAGNOSIS — E78.5 DYSLIPIDEMIA: ICD-10-CM

## 2018-11-30 DIAGNOSIS — Z78.0 POST-MENOPAUSAL: ICD-10-CM

## 2018-11-30 DIAGNOSIS — E55.9 VITAMIN D DEFICIENCY: ICD-10-CM

## 2018-11-30 DIAGNOSIS — F51.01 PRIMARY INSOMNIA: Primary | ICD-10-CM

## 2018-11-30 PROBLEM — J11.1 INFLUENZA: Status: RESOLVED | Noted: 2018-02-07 | Resolved: 2018-11-30

## 2018-11-30 PROCEDURE — 1036F TOBACCO NON-USER: CPT | Performed by: FAMILY MEDICINE

## 2018-11-30 PROCEDURE — 99213 OFFICE O/P EST LOW 20 MIN: CPT | Performed by: FAMILY MEDICINE

## 2018-11-30 PROCEDURE — 3008F BODY MASS INDEX DOCD: CPT | Performed by: FAMILY MEDICINE

## 2018-11-30 RX ORDER — GUAIFENESIN 600 MG
TABLET, EXTENDED RELEASE 12 HR ORAL
COMMUNITY

## 2018-11-30 RX ORDER — AMITRIPTYLINE HYDROCHLORIDE 10 MG/1
TABLET, FILM COATED ORAL
Qty: 60 TABLET | Refills: 0 | Status: SHIPPED | OUTPATIENT
Start: 2018-11-30 | End: 2019-01-09

## 2018-12-02 NOTE — PROGRESS NOTES
Assessment/Plan:  O'clock is reviewed for treating insomnia  Stress plays a role at this time  Patient and her  are hoping to move closer to her new job in [place]  Patient opts for amitriptyline 10 mg 1-2 tablets at bedtime  She will update us in a couple weeks as to her progress  Recommend full labs regarding certain menopausal issues  Include thyroid and estradiol  Flu shot up-to-date  Gyn up-to-date  Diagnoses and all orders for this visit:    Primary insomnia  -     amitriptyline (ELAVIL) 10 mg tablet; 1-2 tabs at bedtime as directed    Post-menopausal  -     TSH, 3rd generation; Future  -     CBC and differential; Future  -     Estradiol; Future  -     FSH and LH; Future    Dyslipidemia  -     Comprehensive metabolic panel; Future  -     Lipid panel; Future  -     CBC and differential; Future    Vitamin D deficiency  -     Vitamin D 25 hydroxy; Future    Other orders  -     guaiFENesin (MUCINEX) 600 mg 12 hr tablet; Mucinex        1  Primary insomnia  amitriptyline (ELAVIL) 10 mg tablet   2  Post-menopausal  TSH, 3rd generation    CBC and differential    Estradiol    FSH and LH   3  Dyslipidemia  Comprehensive metabolic panel    Lipid panel    CBC and differential   4  Vitamin D deficiency  Vitamin D 25 hydroxy       Subjective:        Patient ID: Remington Schwartz is a 66 y o  female  Chief Complaint   Patient presents with    Insomnia     trouble sleeping; pt states that its been months now, no changes to daily activites     Hip Pain     both hip pain, Sx have been on and off for years pt states has gotten worse    discuss     discuss Hep C screening, discuss T-dap       81-year-old patient with issues with insomnia and menopausal like symptoms  Up-to-date with gyn  Is looking something to help her sleep  Under some stress as she has a new job in [place] and her and her  hope to move in the new year        Hip Pain          The following portions of the patient's history were reviewed and updated as appropriate: past medical history, past surgical history and problem list       Review of Systems   Constitutional: Negative for fatigue  Respiratory: Negative for chest tightness and shortness of breath  Cardiovascular: Negative for chest pain  Neurological: Negative for headaches  Psychiatric/Behavioral:        Insomnia         Objective:  /78 (BP Location: Left arm, Patient Position: Sitting, Cuff Size: Standard)   Ht 5' 6 5" (1 689 m)   Wt 67 9 kg (149 lb 9 6 oz)   BMI 23 78 kg/m²        Physical Exam   Constitutional: No distress  HENT:   Head: Normocephalic  Cardiovascular: Normal rate and regular rhythm  No murmur heard  Pulmonary/Chest: Breath sounds normal    Neurological: She is alert  Psychiatric: She has a normal mood and affect  Her behavior is normal  Thought content normal    Nursing note and vitals reviewed

## 2018-12-07 ENCOUNTER — APPOINTMENT (OUTPATIENT)
Dept: LAB | Facility: MEDICAL CENTER | Age: 58
End: 2018-12-07
Payer: COMMERCIAL

## 2018-12-07 DIAGNOSIS — E55.9 VITAMIN D DEFICIENCY: ICD-10-CM

## 2018-12-07 DIAGNOSIS — E78.5 DYSLIPIDEMIA: ICD-10-CM

## 2018-12-07 DIAGNOSIS — Z78.0 POST-MENOPAUSAL: ICD-10-CM

## 2018-12-07 LAB
25(OH)D3 SERPL-MCNC: 52.4 NG/ML (ref 30–100)
ALBUMIN SERPL BCP-MCNC: 3.9 G/DL (ref 3.5–5)
ALP SERPL-CCNC: 81 U/L (ref 46–116)
ALT SERPL W P-5'-P-CCNC: 28 U/L (ref 12–78)
ANION GAP SERPL CALCULATED.3IONS-SCNC: 5 MMOL/L (ref 4–13)
AST SERPL W P-5'-P-CCNC: 18 U/L (ref 5–45)
BASOPHILS # BLD AUTO: 0.07 THOUSANDS/ΜL (ref 0–0.1)
BASOPHILS NFR BLD AUTO: 1 % (ref 0–1)
BILIRUB SERPL-MCNC: 0.6 MG/DL (ref 0.2–1)
BUN SERPL-MCNC: 18 MG/DL (ref 5–25)
CALCIUM SERPL-MCNC: 9.4 MG/DL (ref 8.3–10.1)
CHLORIDE SERPL-SCNC: 105 MMOL/L (ref 100–108)
CHOLEST SERPL-MCNC: 217 MG/DL (ref 50–200)
CO2 SERPL-SCNC: 29 MMOL/L (ref 21–32)
CREAT SERPL-MCNC: 0.75 MG/DL (ref 0.6–1.3)
EOSINOPHIL # BLD AUTO: 0.08 THOUSAND/ΜL (ref 0–0.61)
EOSINOPHIL NFR BLD AUTO: 2 % (ref 0–6)
ERYTHROCYTE [DISTWIDTH] IN BLOOD BY AUTOMATED COUNT: 12.2 % (ref 11.6–15.1)
ESTRADIOL SERPL-MCNC: <11 PG/ML
FSH SERPL-ACNC: 77.6 MIU/ML
GFR SERPL CREATININE-BSD FRML MDRD: 89 ML/MIN/1.73SQ M
GLUCOSE P FAST SERPL-MCNC: 81 MG/DL (ref 65–99)
HCT VFR BLD AUTO: 40 % (ref 34.8–46.1)
HDLC SERPL-MCNC: 49 MG/DL (ref 40–60)
HGB BLD-MCNC: 13.3 G/DL (ref 11.5–15.4)
IMM GRANULOCYTES # BLD AUTO: 0.02 THOUSAND/UL (ref 0–0.2)
IMM GRANULOCYTES NFR BLD AUTO: 0 % (ref 0–2)
LDLC SERPL CALC-MCNC: 131 MG/DL (ref 0–100)
LH SERPL-ACNC: 25 MIU/ML
LYMPHOCYTES # BLD AUTO: 2.14 THOUSANDS/ΜL (ref 0.6–4.47)
LYMPHOCYTES NFR BLD AUTO: 40 % (ref 14–44)
MCH RBC QN AUTO: 30.6 PG (ref 26.8–34.3)
MCHC RBC AUTO-ENTMCNC: 33.3 G/DL (ref 31.4–37.4)
MCV RBC AUTO: 92 FL (ref 82–98)
MONOCYTES # BLD AUTO: 0.43 THOUSAND/ΜL (ref 0.17–1.22)
MONOCYTES NFR BLD AUTO: 8 % (ref 4–12)
NEUTROPHILS # BLD AUTO: 2.66 THOUSANDS/ΜL (ref 1.85–7.62)
NEUTS SEG NFR BLD AUTO: 49 % (ref 43–75)
NONHDLC SERPL-MCNC: 168 MG/DL
NRBC BLD AUTO-RTO: 0 /100 WBCS
PLATELET # BLD AUTO: 263 THOUSANDS/UL (ref 149–390)
PMV BLD AUTO: 11.7 FL (ref 8.9–12.7)
POTASSIUM SERPL-SCNC: 3.7 MMOL/L (ref 3.5–5.3)
PROT SERPL-MCNC: 7.3 G/DL (ref 6.4–8.2)
RBC # BLD AUTO: 4.34 MILLION/UL (ref 3.81–5.12)
SODIUM SERPL-SCNC: 139 MMOL/L (ref 136–145)
TRIGL SERPL-MCNC: 185 MG/DL
TSH SERPL DL<=0.05 MIU/L-ACNC: 2.85 UIU/ML (ref 0.36–3.74)
WBC # BLD AUTO: 5.4 THOUSAND/UL (ref 4.31–10.16)

## 2018-12-07 PROCEDURE — 80053 COMPREHEN METABOLIC PANEL: CPT

## 2018-12-07 PROCEDURE — 85025 COMPLETE CBC W/AUTO DIFF WBC: CPT

## 2018-12-07 PROCEDURE — 83002 ASSAY OF GONADOTROPIN (LH): CPT

## 2018-12-07 PROCEDURE — 82670 ASSAY OF TOTAL ESTRADIOL: CPT

## 2018-12-07 PROCEDURE — 36415 COLL VENOUS BLD VENIPUNCTURE: CPT

## 2018-12-07 PROCEDURE — 84443 ASSAY THYROID STIM HORMONE: CPT

## 2018-12-07 PROCEDURE — 80061 LIPID PANEL: CPT

## 2018-12-07 PROCEDURE — 83001 ASSAY OF GONADOTROPIN (FSH): CPT

## 2018-12-07 PROCEDURE — 82306 VITAMIN D 25 HYDROXY: CPT

## 2019-01-09 ENCOUNTER — OFFICE VISIT (OUTPATIENT)
Dept: FAMILY MEDICINE CLINIC | Facility: CLINIC | Age: 59
End: 2019-01-09
Payer: COMMERCIAL

## 2019-01-09 VITALS
WEIGHT: 150.8 LBS | DIASTOLIC BLOOD PRESSURE: 78 MMHG | SYSTOLIC BLOOD PRESSURE: 112 MMHG | BODY MASS INDEX: 23.67 KG/M2 | HEIGHT: 67 IN

## 2019-01-09 DIAGNOSIS — J01.00 ACUTE NON-RECURRENT MAXILLARY SINUSITIS: Primary | ICD-10-CM

## 2019-01-09 PROCEDURE — 3008F BODY MASS INDEX DOCD: CPT | Performed by: FAMILY MEDICINE

## 2019-01-09 PROCEDURE — 99213 OFFICE O/P EST LOW 20 MIN: CPT | Performed by: FAMILY MEDICINE

## 2019-01-09 RX ORDER — LORATADINE 10 MG/1
TABLET ORAL
COMMUNITY
Start: 1996-01-01 | End: 2019-01-09

## 2019-01-09 RX ORDER — METHYLPREDNISOLONE 4 MG/1
TABLET ORAL
Qty: 21 TABLET | Refills: 0 | Status: SHIPPED | OUTPATIENT
Start: 2019-01-09 | End: 2020-03-15

## 2019-01-09 RX ORDER — SULFAMETHOXAZOLE AND TRIMETHOPRIM 800; 160 MG/1; MG/1
1 TABLET ORAL EVERY 12 HOURS SCHEDULED
Qty: 20 TABLET | Refills: 0 | Status: SHIPPED | OUTPATIENT
Start: 2019-01-09 | End: 2019-01-19

## 2019-01-11 NOTE — PROGRESS NOTES
Assessment/Plan:  Treat sinusitis with Bactrim twice daily for 10 days as well as as Medrol Dosepak  Patient can do ocean nasal spray and stain  Recheck as needed  Diagnoses and all orders for this visit:    Acute non-recurrent maxillary sinusitis  -     Methylprednisolone 4 MG TBPK; Use as directed on package  -     sulfamethoxazole-trimethoprim (BACTRIM DS) 800-160 mg per tablet; Take 1 tablet by mouth every 12 (twelve) hours for 10 days        1  Acute non-recurrent maxillary sinusitis  Methylprednisolone 4 MG TBPK    sulfamethoxazole-trimethoprim (BACTRIM DS) 800-160 mg per tablet       Subjective:        Patient ID: Gael Hammond is a 66 y o  female  Chief Complaint   Patient presents with    URI     sx started over a week ago, draining mucus green/yellow, coughing, no fevers L side congestion       Patient with sinus like congestion and drainage greater than 1 week  Has sinus pressure  The following portions of the patient's history were reviewed and updated as appropriate: past medical history, past surgical history and problem list       Review of Systems   Constitutional: Negative for fatigue  HENT: Positive for congestion, sinus pain and sinus pressure  Negative for sore throat  Respiratory: Negative for shortness of breath  Cardiovascular: Negative for chest pain  Neurological: Negative for headaches  Objective:  /78 (BP Location: Left arm, Patient Position: Sitting, Cuff Size: Standard)   Ht 5' 6 75" (1 695 m)   Wt 68 4 kg (150 lb 12 8 oz)   BMI 23 80 kg/m²        Physical Exam   Constitutional: She is oriented to person, place, and time  She appears well-nourished  No distress  HENT:   Right Ear: External ear normal    Left Ear: External ear normal    Mouth/Throat: Oropharynx is clear and moist  No oropharyngeal exudate  Minimal nasal congestion  Slightly tender left maxillary sinus   Eyes: Conjunctivae are normal    Neck: No thyromegaly present  Cardiovascular: Normal rate, regular rhythm and normal heart sounds  No murmur heard  Pulmonary/Chest: Breath sounds normal    Musculoskeletal: She exhibits no edema  Lymphadenopathy:     She has no cervical adenopathy  Neurological: She is alert and oriented to person, place, and time  Nursing note and vitals reviewed

## 2019-02-02 ENCOUNTER — OFFICE VISIT (OUTPATIENT)
Dept: URGENT CARE | Facility: CLINIC | Age: 59
End: 2019-02-02
Payer: COMMERCIAL

## 2019-02-02 VITALS
BODY MASS INDEX: 22.73 KG/M2 | DIASTOLIC BLOOD PRESSURE: 68 MMHG | OXYGEN SATURATION: 96 % | SYSTOLIC BLOOD PRESSURE: 140 MMHG | HEART RATE: 74 BPM | HEIGHT: 68 IN | TEMPERATURE: 98.6 F | RESPIRATION RATE: 18 BRPM | WEIGHT: 150 LBS

## 2019-02-02 DIAGNOSIS — H65.03 BILATERAL ACUTE SEROUS OTITIS MEDIA, RECURRENCE NOT SPECIFIED: Primary | ICD-10-CM

## 2019-02-02 DIAGNOSIS — J01.01 ACUTE RECURRENT MAXILLARY SINUSITIS: ICD-10-CM

## 2019-02-02 PROCEDURE — 99213 OFFICE O/P EST LOW 20 MIN: CPT | Performed by: EMERGENCY MEDICINE

## 2019-02-02 RX ORDER — SULFAMETHOXAZOLE AND TRIMETHOPRIM 800; 160 MG/1; MG/1
1 TABLET ORAL EVERY 12 HOURS SCHEDULED
Qty: 14 TABLET | Refills: 0 | Status: SHIPPED | OUTPATIENT
Start: 2019-02-02 | End: 2019-02-09

## 2019-02-02 RX ORDER — PREDNISONE 10 MG/1
TABLET ORAL
Qty: 27 TABLET | Refills: 0 | Status: SHIPPED | OUTPATIENT
Start: 2019-02-02 | End: 2020-06-26 | Stop reason: ALTCHOICE

## 2019-02-02 NOTE — PROGRESS NOTES
[facility]        NAME: Daryle Nao is a 66 y o  female  : 1960    MRN: [id number]  DATE: 2019  TIME: 11:28 AM    Assessment and Plan   Bilateral acute serous otitis media, recurrence not specified [H65 03]  1  Bilateral acute serous otitis media, recurrence not specified  predniSONE 10 mg tablet   2  Acute recurrent maxillary sinusitis  predniSONE 10 mg tablet    sulfamethoxazole-trimethoprim (BACTRIM DS) 800-160 mg per tablet         Patient Instructions     Patient Instructions     Take an expectorant - guaifenesin should be the only ingredient - during the day, and the cough suppressant (ex  Robitussin DM or Tessalon) if needed at night only  Take Zinc 12 5 to 15 mg every 2 - 3 hrs while awake for the next few days  You may take Cold Derick (13 3 mg of Zinc) or split a 25 mg Zinc tablet or lozenge in two or a 50 mg into four to get the proper dose  The total daily dose of Zinc should exceed 75 mg per day  You may also take a decongestant like Sudafed, unless you have hypertension or cardiac disease  Hold any NSAIDs like Ibuprofen (Advil), Naprosyn (Aleve), etc while on steroids like Medrol or Prednisone    Sinusitis, Ambulatory Care   GENERAL INFORMATION:   Sinusitis  is inflammation or infection of your sinuses  It is most often caused by a virus  Acute sinusitis may last up to 12 weeks  Chronic sinusitis lasts longer than 12 weeks  Recurrent sinusitis is when you have 3 or more episodes of sinusitis in 1 year    Common symptoms include the following:   · Fever    · Pain, pressure, redness, or swelling around the forehead, cheeks, or eyes    · Thick yellow or green discharge from your nose    · Tenderness when you touch your face over your sinuses    · Dry cough that happens mostly at night or when you lie down    · Headache and face pain that is worse when you lean forward    · Teeth pain or pain when you chew  Seek immediate care for the following symptoms:   · Vision changes such as double vision    · Confusion or trouble thinking clearly    · Headache and stiff neck    · Trouble breathing  Treatment for sinusitis  may include medicines to relieve nasal and sinus congestion or to decrease pain and fever  Ask your healthcare provider which medicines you should take and how much is safe  Manage sinusitis:   · Drink liquids as directed  Ask your healthcare provider how much liquid to drink each day and which liquids are best for you  Liquids will help loosen and drain the mucus in your sinuses  · Breathe in steam   Heat a bowl of water until you see steam  Lean over the bowl and make a tent over your head with a large towel  Breathe deeply for about 20 minutes  Be careful not to get too close to the steam or burn yourself  Do this 3 times a day  You can also breathe deeply when you take a hot shower  · Rinse your sinuses  Use a sinus rinse device to rinse your nasal passages with a saline (salt water) solution  This will help thin the mucus in your nose and rinse away pollen and dirt  It will also help reduce swelling so you can breathe normally  Ask how often to do this  · Use heat on your sinuses  to decrease pain  Apply heat for 15 to 20 minutes every hour for as many days as directed  · Sleep with your head elevated  Place an extra pillow under your head before you go to sleep to help your sinuses drain  · Do not smoke and avoid secondhand smoke  If you smoke, it is never too late to quit  Ask for information about how to stop smoking if you need help  Prevent the spread of germs that cause sinusitis:  Wash your hands often with soap and water  Wash your hands after you use the bathroom, change a child's diaper, or sneeze  Wash your hands before you prepare or eat food  Follow up with your healthcare provider as directed:  Write down your questions so you remember to ask them during your visits  CARE AGREEMENT:   You have the right to help plan your care  Learn about your health condition and how it may be treated  Discuss treatment options with your caregivers to decide what care you want to receive  You always have the right to refuse treatment  The above information is an  only  It is not intended as medical advice for individual conditions or treatments  Talk to your doctor, nurse or pharmacist before following any medical regimen to see if it is safe and effective for you  © 2014 [address] is for End User's use only and may not be sold, redistributed or otherwise used for commercial purposes  All illustrations and images included in CareNotes® are the copyrighted property of [employer]  or [employer]  Serous Otitis Media   WHAT YOU NEED TO KNOW:   Serous otitis media is fluid trapped behind your tympanic membrane (eardrum), without an ear infection  Your eardrum is in your middle ear  Serous otitis media is also called otitis media with effusion  You may have fluid in your ear for months, but it usually goes away on its own  The fluid may be in one or both ears  The fluid may cause muffled sounds, and you may feel like your ears are full  Serous otitis media may be caused by an upper respiratory infection or allergies  It is most common in the fall and early spring  DISCHARGE INSTRUCTIONS:   Return to the emergency department if:   · You have a fever  · You have a sudden loss of hearing in your affected ear  · You develop a severe headache and stiff neck  · You have a seizure  Contact your healthcare provider if:   · You have fluid draining from your ear  · You have new symptoms  · You have questions or concerns about your condition or care  Follow up with your healthcare provider as directed: Your ears will need to be checked regularly  You may need to see a specialist  Write down your questions so you remember to ask them during your visits    © 2017 [facility] is for End User's use only and may not be sold, redistributed or otherwise used for commercial purposes  All illustrations and images included in CareNotes® are the copyrighted property of A D [employer] M [employer] Inc  or Reagan Dawson  The above information is an  only  It is not intended as medical advice for individual conditions or treatments  Talk to your doctor, nurse or pharmacist before following any medical regimen to see if it is safe and effective for you  Follow up with PCP in 3-5 days  Proceed to  ER if symptoms worsen  Chief Complaint     Chief Complaint   Patient presents with    Facial Pain     5 days with runny nose facial pressure headache ear pressure          History of Present Illness       Patient with nasal congestion for the past 5 days now with increasing pain and pressure left maxillary sinus  She has history of chronic left maxillary sinus problems for which she has refused surgery so far  She also complains of bilateral ear fullness  Review of Systems   Review of Systems   Constitutional: Negative for chills and fever  HENT: Positive for congestion, ear pain, rhinorrhea, sinus pain, sinus pressure and sore throat  Negative for ear discharge, tinnitus, trouble swallowing and voice change  Respiratory: Positive for cough  Negative for chest tightness, shortness of breath and wheezing  Cardiovascular: Negative for chest pain  Neurological: Negative for dizziness and light-headedness  Current Medications       Current Outpatient Prescriptions:     ASPIRIN 81 PO, Take by mouth, Disp: , Rfl:     Cholecalciferol 5000 units capsule, Take by mouth, Disp: , Rfl:     DAILY MULTIPLE VITAMINS PO, Take 1 tablet by mouth daily, Disp: , Rfl:     fexofenadine (ALLEGRA ALLERGY) 180 MG tablet, Take 1 capsule by mouth daily, Disp: , Rfl:     fluticasone (FLONASE) 50 mcg/act nasal spray, use 2 sprays in each nostril daily   , Disp: 16 g, Rfl: 0    guaiFENesin (MUCINEX) 600 mg 12 hr tablet, Mucinex, Disp: , Rfl:     Methylprednisolone 4 MG TBPK, Use as directed on package (Patient not taking: Reported on 2019 ), Disp: 21 tablet, Rfl: 0    predniSONE 10 mg tablet, Take once daily all days pills on this schedule 6- 6- 5- 4- 3- 2- 1, Disp: 27 tablet, Rfl: 0    sulfamethoxazole-trimethoprim (BACTRIM DS) 800-160 mg per tablet, Take 1 tablet by mouth every 12 (twelve) hours for 7 days, Disp: 14 tablet, Rfl: 0    Current Allergies     Allergies as of 2019 - Reviewed 2019   Allergen Reaction Noted    Penicillins  2017    Phenergan [promethazine]  2017            The following portions of the patient's history were reviewed and updated as appropriate: allergies, current medications, past family history, past medical history, past social history, past surgical history and problem list      Past Medical History:   Diagnosis Date    Cardiac arrhythmia     resolved 2016    GERD (gastroesophageal reflux disease)     Hyperkalemia     resolved 2016    Pulmonary embolism ([address] )     resolved 2016    Supraventricular tachycardia ([facility] [address] )     esolved 2016       Past Surgical History:   Procedure Laterality Date     SECTION      COLONOSCOPY      ESOPHAGOGASTRODUODENOSCOPY      2014    TONSILLECTOMY      VEIN LIGATION      with stripping       Family History   Problem Relation Age of Onset    Hypothyroidism Mother     Hypertension Mother     Hypothyroidism Sister     Other Sister         benign polyps of the large intestine     Hypothyroidism Maternal Grandmother     Hypothyroidism Paternal Grandmother     Esophageal cancer Father     Hypertension Father     Stroke Father          Medications have been verified          Objective   /68   Pulse 74   Temp 98 6 °F (37 °C)   Resp 18   Ht 5' 7 5" (1 715 m)   Wt 68 kg (150 lb)   SpO2 96%   BMI 23 15 kg/m² Physical Exam     Physical Exam   Constitutional: She is oriented to person, place, and time  She appears well-developed and well-nourished  No distress  HENT:   Head: Normocephalic and atraumatic  Right Ear: Tympanic membrane and external ear normal    Left Ear: Tympanic membrane and external ear normal    Nose: Mucosal edema present  Mouth/Throat: Posterior oropharyngeal erythema present  No oropharyngeal exudate or tonsillar abscesses  Tender over left maxillary sinus  Eyes: Pupils are equal, round, and reactive to light  Conjunctivae are normal    Neck: Normal range of motion  Neck supple  Cardiovascular: Normal rate and regular rhythm  Pulmonary/Chest: Effort normal and breath sounds normal    Abdominal: Soft  Bowel sounds are normal    Neurological: She is alert and oriented to person, place, and time  Skin: Skin is warm and dry  Nursing note and vitals reviewed

## 2019-02-02 NOTE — PATIENT INSTRUCTIONS
Take an expectorant - guaifenesin should be the only ingredient - during the day, and the cough suppressant (ex  Robitussin DM or Tessalon) if needed at night only  Take Zinc 12 5 to 15 mg every 2 - 3 hrs while awake for the next few days  You may take Cold Derick (13 3 mg of Zinc) or split a 25 mg Zinc tablet or lozenge in two or a 50 mg into four to get the proper dose  The total daily dose of Zinc should exceed 75 mg per day  You may also take a decongestant like Sudafed, unless you have hypertension or cardiac disease  Hold any NSAIDs like Ibuprofen (Advil), Naprosyn (Aleve), etc while on steroids like Medrol or Prednisone    Sinusitis, Ambulatory Care   GENERAL INFORMATION:   Sinusitis  is inflammation or infection of your sinuses  It is most often caused by a virus  Acute sinusitis may last up to 12 weeks  Chronic sinusitis lasts longer than 12 weeks  Recurrent sinusitis is when you have 3 or more episodes of sinusitis in 1 year  Common symptoms include the following:   · Fever    · Pain, pressure, redness, or swelling around the forehead, cheeks, or eyes    · Thick yellow or green discharge from your nose    · Tenderness when you touch your face over your sinuses    · Dry cough that happens mostly at night or when you lie down    · Headache and face pain that is worse when you lean forward    · Teeth pain or pain when you chew  Seek immediate care for the following symptoms:   · Vision changes such as double vision    · Confusion or trouble thinking clearly    · Headache and stiff neck    · Trouble breathing  Treatment for sinusitis  may include medicines to relieve nasal and sinus congestion or to decrease pain and fever  Ask your healthcare provider which medicines you should take and how much is safe  Manage sinusitis:   · Drink liquids as directed  Ask your healthcare provider how much liquid to drink each day and which liquids are best for you   Liquids will help loosen and drain the mucus in your sinuses  · Breathe in steam   Heat a bowl of water until you see steam  Lean over the bowl and make a tent over your head with a large towel  Breathe deeply for about 20 minutes  Be careful not to get too close to the steam or burn yourself  Do this 3 times a day  You can also breathe deeply when you take a hot shower  · Rinse your sinuses  Use a sinus rinse device to rinse your nasal passages with a saline (salt water) solution  This will help thin the mucus in your nose and rinse away pollen and dirt  It will also help reduce swelling so you can breathe normally  Ask how often to do this  · Use heat on your sinuses  to decrease pain  Apply heat for 15 to 20 minutes every hour for as many days as directed  · Sleep with your head elevated  Place an extra pillow under your head before you go to sleep to help your sinuses drain  · Do not smoke and avoid secondhand smoke  If you smoke, it is never too late to quit  Ask for information about how to stop smoking if you need help  Prevent the spread of germs that cause sinusitis:  Wash your hands often with soap and water  Wash your hands after you use the bathroom, change a child's diaper, or sneeze  Wash your hands before you prepare or eat food  Follow up with your healthcare provider as directed:  Write down your questions so you remember to ask them during your visits  CARE AGREEMENT:   You have the right to help plan your care  Learn about your health condition and how it may be treated  Discuss treatment options with your caregivers to decide what care you want to receive  You always have the right to refuse treatment  The above information is an  only  It is not intended as medical advice for individual conditions or treatments  Talk to your doctor, nurse or pharmacist before following any medical regimen to see if it is safe and effective for you    © 2014 [address] is for End User's use only and may not be sold, redistributed or otherwise used for commercial purposes  All illustrations and images included in CareNotes® are the copyrighted property of A D A M , Inc  or Reagan Dawson  Serous Otitis Media   WHAT YOU NEED TO KNOW:   Serous otitis media is fluid trapped behind your tympanic membrane (eardrum), without an ear infection  Your eardrum is in your middle ear  Serous otitis media is also called otitis media with effusion  You may have fluid in your ear for months, but it usually goes away on its own  The fluid may be in one or both ears  The fluid may cause muffled sounds, and you may feel like your ears are full  Serous otitis media may be caused by an upper respiratory infection or allergies  It is most common in the fall and early spring  DISCHARGE INSTRUCTIONS:   Return to the emergency department if:   · You have a fever  · You have a sudden loss of hearing in your affected ear  · You develop a severe headache and stiff neck  · You have a seizure  Contact your healthcare provider if:   · You have fluid draining from your ear  · You have new symptoms  · You have questions or concerns about your condition or care  Follow up with your healthcare provider as directed: Your ears will need to be checked regularly  You may need to see a specialist  Write down your questions so you remember to ask them during your visits  © 2017 [address]  Information is for End User's use only and may not be sold, redistributed or otherwise used for commercial purposes  All illustrations and images included in CareNotes® are the copyrighted property of A D A M , Inc  or Reagan Dawson  The above information is an  only  It is not intended as medical advice for individual conditions or treatments  Talk to your doctor, nurse or pharmacist before following any medical regimen to see if it is safe and effective for you

## 2019-03-01 DIAGNOSIS — Z12.39 SCREENING FOR BREAST CANCER: Primary | ICD-10-CM

## 2019-05-08 ENCOUNTER — CLINICAL SUPPORT (OUTPATIENT)
Dept: FAMILY MEDICINE CLINIC | Facility: CLINIC | Age: 59
End: 2019-05-08
Payer: COMMERCIAL

## 2019-05-08 DIAGNOSIS — Z11.1 SCREENING FOR TUBERCULOSIS: Primary | ICD-10-CM

## 2019-05-08 PROCEDURE — 86580 TB INTRADERMAL TEST: CPT | Performed by: FAMILY MEDICINE

## 2019-05-10 ENCOUNTER — CLINICAL SUPPORT (OUTPATIENT)
Dept: FAMILY MEDICINE CLINIC | Facility: CLINIC | Age: 59
End: 2019-05-10

## 2019-05-10 DIAGNOSIS — Z11.1 ENCOUNTER FOR PPD SKIN TEST READING: Primary | ICD-10-CM

## 2019-05-10 LAB
INDURATION: 0 MM
TB SKIN TEST: NEGATIVE

## 2019-06-07 DIAGNOSIS — Z86.19 HX OF TRAVELER'S DIARRHEA: Primary | ICD-10-CM

## 2019-06-07 RX ORDER — ONDANSETRON 4 MG/1
4 TABLET, ORALLY DISINTEGRATING ORAL EVERY 6 HOURS PRN
Qty: 30 TABLET | Refills: 0 | Status: SHIPPED | OUTPATIENT
Start: 2019-06-07 | End: 2020-06-26

## 2019-06-07 RX ORDER — CIPROFLOXACIN 500 MG/1
500 TABLET, FILM COATED ORAL EVERY 12 HOURS SCHEDULED
Qty: 28 TABLET | Refills: 0 | Status: SHIPPED | OUTPATIENT
Start: 2019-06-07 | End: 2019-06-21

## 2019-11-22 DIAGNOSIS — J30.2 SEASONAL ALLERGIC RHINITIS: ICD-10-CM

## 2019-11-24 RX ORDER — FLUTICASONE PROPIONATE 50 MCG
2 SPRAY, SUSPENSION (ML) NASAL DAILY
Qty: 16 G | Refills: 0 | Status: SHIPPED | OUTPATIENT
Start: 2019-11-24

## 2020-03-13 ENCOUNTER — TELEPHONE (OUTPATIENT)
Dept: FAMILY MEDICINE CLINIC | Facility: CLINIC | Age: 60
End: 2020-03-13

## 2020-03-15 DIAGNOSIS — E78.5 DYSLIPIDEMIA: Primary | ICD-10-CM

## 2020-03-18 LAB
ALBUMIN SERPL-MCNC: 4.2 G/DL (ref 3.8–4.9)
ALBUMIN/GLOB SERPL: 1.8 {RATIO} (ref 1.2–2.2)
ALP SERPL-CCNC: 95 IU/L (ref 39–117)
ALT SERPL-CCNC: 27 IU/L (ref 0–32)
AST SERPL-CCNC: 25 IU/L (ref 0–40)
BILIRUB SERPL-MCNC: 0.3 MG/DL (ref 0–1.2)
BUN SERPL-MCNC: 19 MG/DL (ref 6–24)
BUN/CREAT SERPL: 28 (ref 9–23)
CALCIUM SERPL-MCNC: 9.5 MG/DL (ref 8.7–10.2)
CHLORIDE SERPL-SCNC: 102 MMOL/L (ref 96–106)
CHOLEST SERPL-MCNC: 223 MG/DL (ref 100–199)
CO2 SERPL-SCNC: 24 MMOL/L (ref 20–29)
CREAT SERPL-MCNC: 0.68 MG/DL (ref 0.57–1)
GLOBULIN SER-MCNC: 2.3 G/DL (ref 1.5–4.5)
GLUCOSE SERPL-MCNC: 87 MG/DL (ref 65–99)
HDLC SERPL-MCNC: 52 MG/DL
LDLC SERPL CALC-MCNC: 140 MG/DL (ref 0–99)
POTASSIUM SERPL-SCNC: 4.4 MMOL/L (ref 3.5–5.2)
PROT SERPL-MCNC: 6.5 G/DL (ref 6–8.5)
SL AMB EGFR AFRICAN AMERICAN: 112 ML/MIN/1.73
SL AMB EGFR NON AFRICAN AMERICAN: 97 ML/MIN/1.73
SL AMB VLDL CHOLESTEROL CALC: 31 MG/DL (ref 5–40)
SODIUM SERPL-SCNC: 140 MMOL/L (ref 134–144)
TRIGL SERPL-MCNC: 157 MG/DL (ref 0–149)

## 2020-06-26 ENCOUNTER — OFFICE VISIT (OUTPATIENT)
Dept: FAMILY MEDICINE CLINIC | Facility: CLINIC | Age: 60
End: 2020-06-26
Payer: COMMERCIAL

## 2020-06-26 VITALS
WEIGHT: 146.8 LBS | BODY MASS INDEX: 23.04 KG/M2 | TEMPERATURE: 98.3 F | HEART RATE: 70 BPM | DIASTOLIC BLOOD PRESSURE: 76 MMHG | HEIGHT: 67 IN | SYSTOLIC BLOOD PRESSURE: 116 MMHG | OXYGEN SATURATION: 97 %

## 2020-06-26 DIAGNOSIS — Z00.00 HEALTH MAINTENANCE EXAMINATION: Primary | ICD-10-CM

## 2020-06-26 DIAGNOSIS — F19.90 EXCESSIVE USE OF NONSTEROIDAL ANTI-INFLAMMATORY DRUGS (NSAIDS): ICD-10-CM

## 2020-06-26 DIAGNOSIS — M85.88 OSTEOPENIA OF LUMBAR SPINE: ICD-10-CM

## 2020-06-26 DIAGNOSIS — Z11.59 NEED FOR HEPATITIS C SCREENING TEST: ICD-10-CM

## 2020-06-26 DIAGNOSIS — Z11.4 ENCOUNTER FOR SCREENING FOR HIV: ICD-10-CM

## 2020-06-26 DIAGNOSIS — M54.50 LUMBAR BACK PAIN: ICD-10-CM

## 2020-06-26 DIAGNOSIS — E55.9 VITAMIN D DEFICIENCY: ICD-10-CM

## 2020-06-26 DIAGNOSIS — E78.5 DYSLIPIDEMIA: ICD-10-CM

## 2020-06-26 DIAGNOSIS — M47.816 SPONDYLOSIS OF LUMBAR SPINE: ICD-10-CM

## 2020-06-26 PROCEDURE — 3008F BODY MASS INDEX DOCD: CPT | Performed by: NURSE PRACTITIONER

## 2020-06-26 PROCEDURE — 99396 PREV VISIT EST AGE 40-64: CPT | Performed by: NURSE PRACTITIONER

## 2020-06-26 RX ORDER — DICLOFENAC SODIUM 75 MG/1
75 TABLET, DELAYED RELEASE ORAL 2 TIMES DAILY
COMMUNITY
End: 2021-09-09

## 2020-06-26 RX ORDER — PANTOPRAZOLE SODIUM 20 MG/1
20 TABLET, DELAYED RELEASE ORAL
Qty: 30 TABLET | Refills: 2 | Status: SHIPPED | OUTPATIENT
Start: 2020-06-26 | End: 2021-08-16

## 2020-06-29 PROBLEM — F19.90 EXCESSIVE USE OF NONSTEROIDAL ANTI-INFLAMMATORY DRUGS (NSAIDS): Status: ACTIVE | Noted: 2020-06-29

## 2020-06-29 PROBLEM — M47.816 SPONDYLOSIS OF LUMBAR SPINE: Status: ACTIVE | Noted: 2020-06-29

## 2020-06-30 ENCOUNTER — TELEPHONE (OUTPATIENT)
Dept: FAMILY MEDICINE CLINIC | Facility: CLINIC | Age: 60
End: 2020-06-30

## 2020-07-01 ENCOUNTER — TELEPHONE (OUTPATIENT)
Dept: ADMINISTRATIVE | Facility: OTHER | Age: 60
End: 2020-07-01

## 2020-07-01 NOTE — TELEPHONE ENCOUNTER
Upon review of the In Basket request we were able to locate, review, and update the patient chart as requested for Mammogram and Pap Smear (HPV) aka Cervical Cancer Screening  Any additional questions or concerns should be emailed to the Practice Liaisons via [email or web address] email, please do not reply via In Basket      Thank you  Vel Topete

## 2020-07-01 NOTE — TELEPHONE ENCOUNTER
----- Message from Kenneth Ba sent at 6/30/2020 11:53 AM EDT -----  Regarding: Mammo and Pap: [facility]  Contact: [phone]  06/30/20 11:56 AM    Hello, our patient Elif Mejia has had Mammogram completed/performed  Please assist in updating the patient chart by pulling the Care Everywhere (CE) document  The date of service is 03/03/2020  Hello, our patient Elif Mejia has had Pap Smear (HPV) aka Cervical Cancer Screening completed/performed  Please assist in updating the patient chart by pulling the Care Everywhere (CE) document  The date of service is 03/12/2020         Thank you,  Ankur Mendoza MA  [facility]

## 2020-08-06 ENCOUNTER — TELEPHONE (OUTPATIENT)
Dept: FAMILY MEDICINE CLINIC | Facility: CLINIC | Age: 60
End: 2020-08-06

## 2020-08-06 NOTE — TELEPHONE ENCOUNTER
----- Message from Josesito Staff sent at 8/6/2020  9:31 AM EDT -----  Regarding: FW: Test Results Question  Contact: [phone]    ----- Message -----  From: Emilee Sherman  Sent: 8/5/2020   4:23 PM EDT  To: [facility]  Subject: Test Results Question                            Hi,  I contacted the MRI center where I had the test and they said the results are now final  Would someone be able to contact them to get the results for me?     Thank you,  Jazmine Patel

## 2020-08-10 NOTE — TELEPHONE ENCOUNTER
Patient returned call and stated that the MRI location is in a previous message to Jose A Smith I faxed the request to [phone]

## 2020-08-11 DIAGNOSIS — M48.061 SPINAL STENOSIS OF LUMBAR REGION, UNSPECIFIED WHETHER NEUROGENIC CLAUDICATION PRESENT: Primary | ICD-10-CM

## 2020-09-24 DIAGNOSIS — M47.816 SPONDYLOSIS OF LUMBAR SPINE: Primary | ICD-10-CM

## 2020-09-24 DIAGNOSIS — F41.9 ANXIETY: ICD-10-CM

## 2020-09-24 PROBLEM — M77.10 LATERAL EPICONDYLITIS: Status: ACTIVE | Noted: 2020-09-24

## 2020-09-24 PROBLEM — M76.899 ENTHESOPATHY OF HIP REGION: Status: ACTIVE | Noted: 2020-09-24

## 2020-09-24 RX ORDER — DULOXETIN HYDROCHLORIDE 30 MG/1
30 CAPSULE, DELAYED RELEASE ORAL DAILY
Qty: 30 CAPSULE | Refills: 1 | Status: SHIPPED | OUTPATIENT
Start: 2020-09-24 | End: 2020-11-20

## 2020-11-20 DIAGNOSIS — F41.9 ANXIETY: ICD-10-CM

## 2020-11-20 DIAGNOSIS — M47.816 SPONDYLOSIS OF LUMBAR SPINE: ICD-10-CM

## 2020-11-20 RX ORDER — DULOXETIN HYDROCHLORIDE 30 MG/1
CAPSULE, DELAYED RELEASE ORAL
Qty: 30 CAPSULE | Refills: 1 | Status: SHIPPED | OUTPATIENT
Start: 2020-11-20 | End: 2021-01-19

## 2021-01-19 DIAGNOSIS — M47.816 SPONDYLOSIS OF LUMBAR SPINE: ICD-10-CM

## 2021-01-19 DIAGNOSIS — F41.9 ANXIETY: ICD-10-CM

## 2021-01-19 RX ORDER — DULOXETIN HYDROCHLORIDE 30 MG/1
CAPSULE, DELAYED RELEASE ORAL
Qty: 30 CAPSULE | Refills: 1 | Status: SHIPPED | OUTPATIENT
Start: 2021-01-19 | End: 2021-03-25

## 2021-03-24 DIAGNOSIS — F41.9 ANXIETY: ICD-10-CM

## 2021-03-24 DIAGNOSIS — M47.816 SPONDYLOSIS OF LUMBAR SPINE: ICD-10-CM

## 2021-03-25 RX ORDER — DULOXETIN HYDROCHLORIDE 30 MG/1
CAPSULE, DELAYED RELEASE ORAL
Qty: 30 CAPSULE | Refills: 2 | Status: SHIPPED | OUTPATIENT
Start: 2021-03-25 | End: 2021-07-09

## 2021-07-09 DIAGNOSIS — F41.9 ANXIETY: ICD-10-CM

## 2021-07-09 DIAGNOSIS — M47.816 SPONDYLOSIS OF LUMBAR SPINE: ICD-10-CM

## 2021-07-09 RX ORDER — DULOXETIN HYDROCHLORIDE 30 MG/1
CAPSULE, DELAYED RELEASE ORAL
Qty: 30 CAPSULE | Refills: 2 | Status: SHIPPED | OUTPATIENT
Start: 2021-07-09 | End: 2021-09-09 | Stop reason: SDUPTHER

## 2021-07-13 ENCOUNTER — TELEPHONE (OUTPATIENT)
Dept: FAMILY MEDICINE CLINIC | Facility: CLINIC | Age: 61
End: 2021-07-13

## 2021-07-13 NOTE — TELEPHONE ENCOUNTER
Manjeet Tate pt is scheduled for her yearly pe on 09/09/21  She uses [facility] is aware you are out of the office this week and will not return til 07/19/21  I did offer to send this to another physician pt declined  and prefers this wait for you  Is she to have any blwk done prior to appointment?

## 2021-07-26 DIAGNOSIS — E78.5 DYSLIPIDEMIA: Primary | ICD-10-CM

## 2021-08-14 DIAGNOSIS — F19.90 EXCESSIVE USE OF NONSTEROIDAL ANTI-INFLAMMATORY DRUGS (NSAIDS): ICD-10-CM

## 2021-08-16 RX ORDER — PANTOPRAZOLE SODIUM 20 MG/1
TABLET, DELAYED RELEASE ORAL
Qty: 30 TABLET | Refills: 2 | Status: SHIPPED | OUTPATIENT
Start: 2021-08-16 | End: 2021-09-09 | Stop reason: SDUPTHER

## 2021-08-16 NOTE — TELEPHONE ENCOUNTER
Requested medication(s) are due for refill today: Yes  Patient has already received a courtesy refill: No  Other reason request has been forwarded to provider: Gastroenterology: Proton Pump Inhibitors [id number]/14/2021 02:21 PM   Valid encounter within last 12 months Protocol Details    Patient is over 3years old

## 2021-09-01 ENCOUNTER — RA CDI HCC (OUTPATIENT)
Dept: OTHER | Facility: HOSPITAL | Age: 61
End: 2021-09-01

## 2021-09-01 NOTE — PROGRESS NOTES
[facility]       Chart reviewed, no opportunity found: CHART REVIEWED, NO OPPORTUNITY FOUND                        Patients insurance company:  [employer] [facility] (Medicare Advantage and Commercial)

## 2021-09-07 LAB
ALBUMIN SERPL-MCNC: 4.4 G/DL (ref 3.8–4.9)
ALBUMIN/GLOB SERPL: 2.2 {RATIO} (ref 1.2–2.2)
ALP SERPL-CCNC: 84 IU/L (ref 48–121)
ALT SERPL-CCNC: 26 IU/L (ref 0–32)
AST SERPL-CCNC: 28 IU/L (ref 0–40)
BASOPHILS # BLD AUTO: 0.1 X10E3/UL (ref 0–0.2)
BASOPHILS NFR BLD AUTO: 1 %
BILIRUB SERPL-MCNC: 0.7 MG/DL (ref 0–1.2)
BUN SERPL-MCNC: 20 MG/DL (ref 6–24)
BUN/CREAT SERPL: 27 (ref 9–23)
CALCIUM SERPL-MCNC: 9.6 MG/DL (ref 8.7–10.2)
CHLORIDE SERPL-SCNC: 103 MMOL/L (ref 96–106)
CHOLEST SERPL-MCNC: 216 MG/DL (ref 100–199)
CO2 SERPL-SCNC: 24 MMOL/L (ref 20–29)
CREAT SERPL-MCNC: 0.74 MG/DL (ref 0.57–1)
EOSINOPHIL # BLD AUTO: 0.2 X10E3/UL (ref 0–0.4)
EOSINOPHIL NFR BLD AUTO: 3 %
ERYTHROCYTE [DISTWIDTH] IN BLOOD BY AUTOMATED COUNT: 12.6 % (ref 11.7–15.4)
GLOBULIN SER-MCNC: 2 G/DL (ref 1.5–4.5)
GLUCOSE SERPL-MCNC: 81 MG/DL (ref 65–99)
HCT VFR BLD AUTO: 39.1 % (ref 34–46.6)
HDLC SERPL-MCNC: 60 MG/DL
HGB BLD-MCNC: 13 G/DL (ref 11.1–15.9)
IMM GRANULOCYTES # BLD: 0 X10E3/UL (ref 0–0.1)
IMM GRANULOCYTES NFR BLD: 0 %
LDLC SERPL CALC-MCNC: 136 MG/DL (ref 0–99)
LYMPHOCYTES # BLD AUTO: 2.4 X10E3/UL (ref 0.7–3.1)
LYMPHOCYTES NFR BLD AUTO: 36 %
MCH RBC QN AUTO: 31 PG (ref 26.6–33)
MCHC RBC AUTO-ENTMCNC: 33.2 G/DL (ref 31.5–35.7)
MCV RBC AUTO: 93 FL (ref 79–97)
MONOCYTES # BLD AUTO: 0.6 X10E3/UL (ref 0.1–0.9)
MONOCYTES NFR BLD AUTO: 9 %
NEUTROPHILS # BLD AUTO: 3.5 X10E3/UL (ref 1.4–7)
NEUTROPHILS NFR BLD AUTO: 51 %
PLATELET # BLD AUTO: 298 X10E3/UL (ref 150–450)
POTASSIUM SERPL-SCNC: 3.9 MMOL/L (ref 3.5–5.2)
PROT SERPL-MCNC: 6.4 G/DL (ref 6–8.5)
RBC # BLD AUTO: 4.2 X10E6/UL (ref 3.77–5.28)
SL AMB EGFR AFRICAN AMERICAN: 103 ML/MIN/1.73
SL AMB EGFR NON AFRICAN AMERICAN: 89 ML/MIN/1.73
SL AMB VLDL CHOLESTEROL CALC: 20 MG/DL (ref 5–40)
SODIUM SERPL-SCNC: 142 MMOL/L (ref 134–144)
TRIGL SERPL-MCNC: 111 MG/DL (ref 0–149)
WBC # BLD AUTO: 6.9 X10E3/UL (ref 3.4–10.8)

## 2021-09-09 ENCOUNTER — OFFICE VISIT (OUTPATIENT)
Dept: FAMILY MEDICINE CLINIC | Facility: CLINIC | Age: 61
End: 2021-09-09
Payer: COMMERCIAL

## 2021-09-09 VITALS
BODY MASS INDEX: 22.95 KG/M2 | DIASTOLIC BLOOD PRESSURE: 84 MMHG | TEMPERATURE: 96.6 F | OXYGEN SATURATION: 98 % | HEART RATE: 64 BPM | HEIGHT: 67 IN | SYSTOLIC BLOOD PRESSURE: 122 MMHG | WEIGHT: 146.2 LBS | RESPIRATION RATE: 14 BRPM

## 2021-09-09 DIAGNOSIS — E78.5 DYSLIPIDEMIA: ICD-10-CM

## 2021-09-09 DIAGNOSIS — Z82.49 FAMILY HISTORY OF EARLY CAD: ICD-10-CM

## 2021-09-09 DIAGNOSIS — E78.00 PURE HYPERCHOLESTEROLEMIA: ICD-10-CM

## 2021-09-09 DIAGNOSIS — M47.816 SPONDYLOSIS OF LUMBAR SPINE: ICD-10-CM

## 2021-09-09 DIAGNOSIS — F41.9 ANXIETY: ICD-10-CM

## 2021-09-09 DIAGNOSIS — Z00.00 HEALTHCARE MAINTENANCE: Primary | ICD-10-CM

## 2021-09-09 DIAGNOSIS — F19.90 EXCESSIVE USE OF NONSTEROIDAL ANTI-INFLAMMATORY DRUGS (NSAIDS): ICD-10-CM

## 2021-09-09 PROCEDURE — 1036F TOBACCO NON-USER: CPT | Performed by: FAMILY MEDICINE

## 2021-09-09 PROCEDURE — 3008F BODY MASS INDEX DOCD: CPT | Performed by: FAMILY MEDICINE

## 2021-09-09 PROCEDURE — 3725F SCREEN DEPRESSION PERFORMED: CPT | Performed by: FAMILY MEDICINE

## 2021-09-09 PROCEDURE — 99396 PREV VISIT EST AGE 40-64: CPT | Performed by: FAMILY MEDICINE

## 2021-09-09 RX ORDER — PANTOPRAZOLE SODIUM 40 MG/1
40 TABLET, DELAYED RELEASE ORAL DAILY
Qty: 90 TABLET | Refills: 3 | Status: SHIPPED | OUTPATIENT
Start: 2021-09-09

## 2021-09-09 RX ORDER — DULOXETIN HYDROCHLORIDE 30 MG/1
30 CAPSULE, DELAYED RELEASE ORAL DAILY
Qty: 90 CAPSULE | Refills: 3 | Status: SHIPPED | OUTPATIENT
Start: 2021-09-09 | End: 2021-11-26

## 2021-09-10 ENCOUNTER — TELEPHONE (OUTPATIENT)
Dept: ADMINISTRATIVE | Facility: OTHER | Age: 61
End: 2021-09-10

## 2021-09-10 NOTE — TELEPHONE ENCOUNTER
----- Message from Whitney Maxwell sent at 9/9/2021  1:31 PM EDT -----  Regarding: Request Quality  09/09/21 1:31 PM    Hello, our patient Kevin Addison has had Mammogram completed/performed  Please assist in updating the patient chart by pulling the Care Everywhere (CE) document  The date of service is 03/09/2021       Thank you,  Whitney Maxwell  [facility] [address]

## 2021-09-10 NOTE — TELEPHONE ENCOUNTER
Upon review of the In Basket request we were able to locate, review, and update the patient chart as requested for Mammogram     Any additional questions or concerns should be emailed to the Practice Liaisons via [email or web address] email, please do not reply via In Basket      Thank you  So Huitron MA

## 2021-09-16 ENCOUNTER — TELEPHONE (OUTPATIENT)
Dept: FAMILY MEDICINE CLINIC | Facility: CLINIC | Age: 61
End: 2021-09-16

## 2021-09-16 NOTE — PROGRESS NOTES
Assessment/Plan: yearly health maintenance completed  Recommend yearly mammography and routine gyn exams  Need to research colonoscopy  Total cholesterol 260 with an LDL of 136  HDL excellent at 60  CMP normal   CBC normal   Recheck yearly  Discussed cardiac testing  Patient has family history of coronary artery disease  Is very interested in CT coronary calcium score  Await test results  COVID vaccine up-to-date  Recommend yearly flu shot  Health Maintenance  Lifestyle Advice: continue current healthy lifestyle patterns and return for routine annual checkups  Diet: well balanced diet  Exercise: frequently  Dental: regular dental visits  Vision: wears glasses  Preventative Health: Female Preventative: Exercises regularly  Does monthly breast self examination  Last mammogram was 3/2021    No problem-specific Assessment & Plan notes found for this encounter  Diagnoses and all orders for this visit:    Healthcare maintenance    Dyslipidemia    Pure hypercholesterolemia  -     CT coronary calcium score; Future    Family history of early CAD  -     CT coronary calcium score; Future    Excessive use of nonsteroidal anti-inflammatory drugs (NSAIDs)  -     pantoprazole (PROTONIX) 40 mg tablet; Take 1 tablet (40 mg total) by mouth daily    Spondylosis of lumbar spine  -     DULoxetine (CYMBALTA) 30 mg delayed release capsule; Take 1 capsule (30 mg total) by mouth daily    Anxiety  -     DULoxetine (CYMBALTA) 30 mg delayed release capsule; Take 1 capsule (30 mg total) by mouth daily    Other orders  -     Cancel: Ambulatory referral for Colonoscopy; Future  -     Cancel: MRI brain pituitary wo and w contrast; Future                  Subjective:      Patient ID: Antonio Chaves is a 63 y o  female  61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   Lives in [place]  [place]aches in [place]  Overall doing well    No complaint        The following portions of the patient's history were reviewed and updated as appropriate: allergies, current medications, past family history, past medical history, past social history, past surgical history and problem list     Review of Systems   Constitutional: Negative for appetite change, fatigue, fever and unexpected weight change  HENT: Negative for congestion, ear pain, postnasal drip, rhinorrhea, sinus pressure, sinus pain and sore throat  Eyes: Negative for redness and visual disturbance  Respiratory: Negative for chest tightness and shortness of breath  Cardiovascular: Negative for chest pain, palpitations and leg swelling  Gastrointestinal: Negative for abdominal distention, abdominal pain, diarrhea and nausea  Endocrine: Negative for cold intolerance and heat intolerance  Genitourinary: Negative for dysuria and hematuria  Musculoskeletal: Negative for arthralgias, gait problem and myalgias  Skin: Negative for pallor and rash  Neurological: Negative for dizziness, tremors, weakness, light-headedness and headaches  Psychiatric/Behavioral: Negative for behavioral problems  The patient is not nervous/anxious  Objective:    /84 (BP Location: Left arm, Patient Position: Sitting, Cuff Size: Adult)   Pulse 64   Temp (!) 96 6 °F (35 9 °C) (Temporal)   Resp 14   Ht 5' 7" (1 702 m)   Wt 66 3 kg (146 lb 3 2 oz)   SpO2 98%   BMI 22 90 kg/m²          Physical Exam  Vitals and nursing note reviewed  Constitutional:       General: She is not in acute distress  Appearance: She is not diaphoretic  HENT:      Head: Normocephalic and atraumatic  Right Ear: Tympanic membrane normal       Left Ear: Tympanic membrane normal    Eyes:      General: No scleral icterus  Conjunctiva/sclera: Conjunctivae normal       Pupils: Pupils are equal, round, and reactive to light  Neck:      Thyroid: No thyromegaly  Cardiovascular:      Rate and Rhythm: Normal rate and regular rhythm        Pulses:           Carotid pulses are 0 on the right side and 0 on the left side  Heart sounds: Normal heart sounds  No murmur heard  Pulmonary:      Effort: Pulmonary effort is normal  No respiratory distress  Breath sounds: Normal breath sounds  No wheezing  Abdominal:      General: There is no distension  Palpations: Abdomen is soft  Musculoskeletal:      Cervical back: Normal range of motion and neck supple  Right lower leg: No edema  Left lower leg: No edema  Lymphadenopathy:      Cervical: No cervical adenopathy  Skin:     General: Skin is warm  Coloration: Skin is not pale  Neurological:      Mental Status: She is alert and oriented to person, place, and time  Cranial Nerves: No cranial nerve deficit  Deep Tendon Reflexes: Reflexes are normal and symmetric  Psychiatric:         Behavior: Behavior normal          Thought Content: Thought content normal          Judgment: Judgment normal              Epinephrine, Penicillins, and Phenergan [promethazine]    Flex Fairbanks had no medications administered during this visit    Health Maintenance   Topic Date Due    COVID-19 Vaccine (1) Never done    Colorectal Cancer Screening  08/28/2020    Annual Physical  06/26/2021    Influenza Vaccine (1) 09/01/2021    Hepatitis C Screening  09/16/2026 (Originally 1960)    DTaP,Tdap,and Td Vaccines (1 - Tdap) 09/16/2026 (Originally 8/3/1983)    HIV Screening  10/16/2026 (Originally 8/3/1975)    Breast Cancer Screening: Mammogram  03/19/2022    Depression Screening  09/09/2022    BMI: Adult  09/09/2022    Cervical Cancer Screening  03/12/2025    Pneumococcal Vaccine: Pediatrics (0 to 5 Years) and At-Risk Patients (6 to 59 Years)  Aged Out    HIB Vaccine  Aged Out    Hepatitis B Vaccine  Aged Out    IPV Vaccine  Aged Out    Hepatitis A Vaccine  Aged Out    Meningococcal ACWY Vaccine  Aged Out    HPV Vaccine  Aged Out      Social History     Socioeconomic History    Marital status: /Civil Union     Spouse name: Not on file    Number of children: Not on file    Years of education: Not on file    Highest education level: Not on file   Occupational History    Not on file   Tobacco Use    Smoking status: Never Smoker    Smokeless tobacco: Never Used   Vaping Use    Vaping Use: Never used   Substance and Sexual Activity    Alcohol use: Yes     Comment: social    Drug use: No    Sexual activity: Not on file   Other Topics Concern    Not on file   Social History Narrative    Engages in travel abroad      Social Determinants of Health     Financial Resource Strain:     Difficulty of Paying Living Expenses:    Food Insecurity:     Worried About Running Out of Food in the Last Year:     [address] in the Last Year:    Transportation Needs:     Lack of Transportation (Medical):      Lack of Transportation (Non-Medical):    Physical Activity:     Days of Exercise per Week:     Minutes of Exercise per Session:    Stress:     Feeling of Stress :    Social Connections:     Frequency of Communication with Friends and Family:     Frequency of Social Gatherings with Friends and Family:     Attends [employer] Services:     Active Member of Clubs or Organizations:     Attends Club or Organization Meetings:     Marital Status:    Intimate Partner Violence:     Fear of Current or Ex-Partner:     Emotionally Abused:     Physically Abused:     Sexually Abused:       Family History   Problem Relation Age of Onset    Hypothyroidism Mother     Hypertension Mother     Hypothyroidism Sister     Other Sister         benign polyps of the large intestine     Hypothyroidism Maternal Grandmother     Hypothyroidism Paternal Grandmother     Esophageal cancer Father     Hypertension Father     Stroke Father       Past Medical History:   Diagnosis Date    Cardiac arrhythmia     resolved 04/21/2016    GERD (gastroesophageal reflux disease)     Hyperkalemia     resolved 04/21/2016    Pulmonary embolism ([facility] [address] )     resolved 2016    Supraventricular tachycardia ([facility] [address] )     esolved 2016      has a past surgical history that includes  section; Tonsillectomy; Colonoscopy; Esophagogastroduodenoscopy; and Vein ligation  Patient Active Problem List    Diagnosis Date Noted    Lateral epicondylitis 2020    Enthesopathy of hip region 2020    Excessive use of nonsteroidal anti-inflammatory drugs (NSAIDs) 2020    Spondylosis of lumbar spine 2020    Dyslipidemia 2014    Osteopenia 2013    Vitamin D deficiency 2012       Current Outpatient Medications:     ASPIRIN 81 PO, Take by mouth, Disp: , Rfl:     Cholecalciferol 5000 units capsule, Take by mouth, Disp: , Rfl:     DAILY MULTIPLE VITAMINS PO, Take 1 tablet by mouth daily, Disp: , Rfl:     DULoxetine (CYMBALTA) 30 mg delayed release capsule, Take 1 capsule (30 mg total) by mouth daily, Disp: 90 capsule, Rfl: 3    fexofenadine (ALLEGRA ALLERGY) 180 MG tablet, Take 1 capsule by mouth daily, Disp: , Rfl:     fluticasone (FLONASE) 50 mcg/act nasal spray, 2 sprays into each nostril daily, Disp: 16 g, Rfl: 0    guaiFENesin (MUCINEX) 600 mg 12 hr tablet, Mucinex, Disp: , Rfl:     pantoprazole (PROTONIX) 40 mg tablet, Take 1 tablet (40 mg total) by mouth daily, Disp: 90 tablet, Rfl: 3    TURMERIC PO, Take 1 capsule by mouth daily, Disp: , Rfl:                 Depression Screening and Follow-up Plan:   Patient was screened for depression during today's encounter  They screened negative with a PHQ-2 score of 0

## 2021-09-16 NOTE — TELEPHONE ENCOUNTER
I spoke to the patient, she is unsure when her last colonoscopy was, she thinks it was done at [facility]  She is going to call their office, and ask them to send us a copy of her last colonoscopy

## 2021-09-16 NOTE — TELEPHONE ENCOUNTER
----- Message from Annamaria Vigil DO sent at 1/12/8827  9:40 AM EDT -----    Patient was here last week for yearly physical   I do not see any colonoscopy  Can we call her to see if and when she has done those in the past as I do not see any record

## 2021-11-02 ENCOUNTER — TELEPHONE (OUTPATIENT)
Dept: FAMILY MEDICINE CLINIC | Facility: CLINIC | Age: 61
End: 2021-11-02

## 2021-11-23 ENCOUNTER — HOSPITAL ENCOUNTER (OUTPATIENT)
Dept: CT IMAGING | Facility: HOSPITAL | Age: 61
Discharge: HOME | End: 2021-11-23
Payer: COMMERCIAL

## 2021-11-23 DIAGNOSIS — E78.00 PURE HYPERCHOLESTEROLEMIA: ICD-10-CM

## 2021-11-23 DIAGNOSIS — Z82.49 FAMILY HISTORY OF EARLY CAD: ICD-10-CM

## 2021-11-23 PROCEDURE — 75571 CT HRT W/O DYE W/CA TEST: CPT

## 2021-11-23 PROCEDURE — G1004 CDSM NDSC: HCPCS

## 2021-11-26 ENCOUNTER — TELEPHONE (OUTPATIENT)
Dept: FAMILY MEDICINE CLINIC | Facility: CLINIC | Age: 61
End: 2021-11-26

## 2021-11-26 DIAGNOSIS — F39 MOOD DISORDER (HCC): Primary | ICD-10-CM

## 2021-11-26 RX ORDER — BUPROPION HYDROCHLORIDE 150 MG/1
150 TABLET ORAL DAILY
Qty: 30 TABLET | Refills: 2 | Status: SHIPPED | OUTPATIENT
Start: 2021-11-26 | End: 2022-04-05

## 2022-04-04 ENCOUNTER — PATIENT MESSAGE (OUTPATIENT)
Dept: FAMILY MEDICINE CLINIC | Facility: CLINIC | Age: 62
End: 2022-04-04

## 2022-04-21 ENCOUNTER — TELEPHONE (OUTPATIENT)
Dept: FAMILY MEDICINE CLINIC | Facility: CLINIC | Age: 62
End: 2022-04-21

## 2022-04-21 DIAGNOSIS — R19.7 DIARRHEA, UNSPECIFIED TYPE: Primary | ICD-10-CM

## 2022-04-21 NOTE — TELEPHONE ENCOUNTER
Orders for stool cultures have been printed  There are on printer    Please mail or fax to patient whichever she prefers

## 2022-04-21 NOTE — TELEPHONE ENCOUNTER
Caitlin Null called the office she has had diarrhea for 3 weeks  Pt has had fever, diarrhea nausea and cramps for 3 weeks  Pt did have bloody diarrhea today she went to urgent care in [place] which she lives  Pt was advised she needs a stool culture  Would you be willing to order a scrip for her without her being seen? Pt did got to [place] 3 weeks ago  Nothing else new to pt's knowledge     Pt's number is [phone]

## 2022-04-21 NOTE — TELEPHONE ENCOUNTER
Pt has a [facility] near her home please fax orders to lab  [facility] fax number is [phone]  Pt's 2 stool orders were faxed to fax number pt provided [phone]  Pt was advised we will fax  Pt is to please follow up with them regarding how about to do thee/ collection

## 2022-04-30 LAB
C DIFF TOX A+B STL QL IA: NEGATIVE
CAMPYLOBACTER STL CULT: NORMAL
Lab: NORMAL
Lab: NORMAL
SALM + SHIG STL CULT: NORMAL
SL AMB E COLI SHIGA TOXIN EIA: NEGATIVE

## 2022-05-12 ENCOUNTER — TELEPHONE (OUTPATIENT)
Dept: FAMILY MEDICINE CLINIC | Facility: CLINIC | Age: 62
End: 2022-05-12

## 2022-05-12 DIAGNOSIS — M47.816 SPONDYLOSIS OF LUMBAR SPINE: ICD-10-CM

## 2022-05-12 NOTE — TELEPHONE ENCOUNTER
----- Message from Simin Rae sent at 5/12/2022  2:49 PM EDT -----  Regarding: FW: Medication Refill    ----- Message -----  From: Carline Peace  Sent: 5/12/2022   2:46 PM EDT  To: [facility]  Subject: Medication Refill                                Radhames Moon,  I need a refill of my diclofenac  I am only taking it once a day with pretty good results      Thank you,  Hussein García

## 2022-05-23 ENCOUNTER — TELEPHONE (OUTPATIENT)
Dept: ADMINISTRATIVE | Facility: OTHER | Age: 62
End: 2022-05-23

## 2022-05-23 NOTE — TELEPHONE ENCOUNTER
Upon review of the In Basket request we have found as a result of outreach that patient did not have the requested item(s) completed  Any additional questions or concerns should be emailed to the Practice Liaisons via [email or web address] email, please do not reply via In Basket      Thank you  Mayo Santiago MA

## 2022-05-23 NOTE — TELEPHONE ENCOUNTER
----- Message from Donavon Ruiz sent at 5/23/2022  8:13 AM EDT -----  Regarding: Pap smear-[facility]  05/23/22 8:14 AM    Lane, our patient Doy Fears has had Pap Smear (HPV) aka Cervical Cancer Screening completed/performed  Please assist in updating the patient chart by pulling the Care Everywhere (CE) document  The date of service is 3/13/21       Thank you,  MERCEDES KUNZ [facility]

## 2022-06-06 DIAGNOSIS — U07.1 COVID-19 VIRUS INFECTION: Primary | ICD-10-CM

## 2022-08-09 DIAGNOSIS — G47.63 SLEEP RELATED TEETH GRINDING: Primary | ICD-10-CM

## 2022-09-13 ENCOUNTER — OFFICE VISIT (OUTPATIENT)
Dept: PULMONOLOGY | Facility: CLINIC | Age: 62
End: 2022-09-13
Payer: COMMERCIAL

## 2022-09-13 VITALS
HEART RATE: 71 BPM | BODY MASS INDEX: 22.58 KG/M2 | RESPIRATION RATE: 16 BRPM | OXYGEN SATURATION: 99 % | HEIGHT: 68 IN | WEIGHT: 149 LBS | SYSTOLIC BLOOD PRESSURE: 130 MMHG | DIASTOLIC BLOOD PRESSURE: 70 MMHG | TEMPERATURE: 99 F

## 2022-09-13 DIAGNOSIS — R06.83 SNORING: Primary | ICD-10-CM

## 2022-09-13 DIAGNOSIS — G47.00 INSOMNIA, UNSPECIFIED TYPE: ICD-10-CM

## 2022-09-13 DIAGNOSIS — R53.83 FATIGUE, UNSPECIFIED TYPE: ICD-10-CM

## 2022-09-13 DIAGNOSIS — G47.19 EXCESSIVE DAYTIME SLEEPINESS: ICD-10-CM

## 2022-09-13 DIAGNOSIS — G47.63 SLEEP RELATED TEETH GRINDING: ICD-10-CM

## 2022-09-13 DIAGNOSIS — G25.81 RESTLESS LEG SYNDROME: ICD-10-CM

## 2022-09-13 PROCEDURE — 99205 OFFICE O/P NEW HI 60 MIN: CPT | Performed by: INTERNAL MEDICINE

## 2022-09-13 NOTE — LETTER
September 13, 0051     Theoswaldo Gloria DO  [address] [facility]  [address]  [address] [facility] [place]    Patient: Augustin Castro   YOB: 1960   Date of Visit: 9/13/2022       Dear Dr William Barksdale: Thank you for referring Elaine King to me for evaluation  Below are my notes for this consultation  If you have questions, please do not hesitate to call me  I look forward to following your patient along with you  Sincerely,        Gely Hitchcock DO        CC: No Recipients  Gely Hitchcock DO  9/13/2022  8:47 PM  Sign when Signing Visit  Sleep Consultation   Augustin Confer 63 y o  female MRN: [id number]      Reason for consultation: FAN, RLS    Requesting physician: Dr William Barksdale    Assessment/Plan  63 y o  F with PMHx of hay fever, chronic sinusitis and GERD who comes in for evaluation of bruxism  1   Bruxism/sleep maintenance insomnia/daytime sleepiness  -  Mallampati class 4, brother recently diagnosed with FAN, Body mass index is 22 99 kg/m² ,    She is at risk for obstructive sleep apnea based on STOP BANG survey  -  Check a home study to assess for obstructive sleep apnea      -  I discussed in depth the diagnostic studies and treatment options involved with obstructive sleep apnea      -  I also discussed in depth the risk of leaving sleep apnea untreated including hypertension, heart failure, arrhythmia, MI and stroke  -  The patient is agreeable to undergo testing and treatment of obstructive sleep apnea  She understands that pitfalls she may encounter along the way and is willing to attempt CPAP treatment  2  Restless legs       -  Treat FAN as above       -  Check Iron studies, magnesium to ensure that is not a cause for PLMs       -  If daytime sleepiness persists after the treatment of FAN, will consider treatment with Neurontin, Pregabalin, Requip or Mirapex     3  Sleep maintenance insomnia - she wakes up several times through the night          - Start a trial of Doxepin 3mg qHS to see if this helps maintain sleep  History of Present Illness   HPI:  Gauri Carlos is a 63 y o  female with PMHx as below who comes in for evaluation of bruxism and frequent nocturnal awakenings  Her dentist was concerned that she may have sleep apnea and recommended an evaluation for sleep apnea  Mrs Harinder Glynn does not snore but also mentioned her brother was recently diagnosed with FAN and does not snore either  She does admit to frequent awakenings through the night  She also notes significant RLS symptoms as well  She denies snoring, awakenings with gasping, witnessed apneas, morning headaches  She does occasionally note some daytime sleepiness and awakenings with dry mouth  she note symptoms of restless legs  she denies symptoms of cataplexy, sleep paralysis, hypnopompic or hypnagogic hallucinations  Sleep History:  she goes to bed at approximately 10-11 pm, will get to sleep in a few min, will get out of bed at 7 am   she will get up up to 3-4 times at night for unknown reaon  It will then take a few minutes to fall back asleep    she does not typically nap during the day  ROS:   Review of Systems   Constitutional: Positive for fatigue and unexpected weight change  HENT: Positive for congestion  Negative for ear pain, nosebleeds and sneezing  Eyes: Negative  Respiratory: Positive for apnea  Negative for shortness of breath and wheezing  Cardiovascular: Negative  Gastrointestinal: Positive for diarrhea and nausea  Endocrine: Positive for cold intolerance  Genitourinary: Negative  Musculoskeletal: Positive for arthralgias  Negative for back pain, myalgias and neck stiffness  Skin: Negative  Allergic/Immunologic: Negative  Neurological: Negative  Hematological: Negative  Psychiatric/Behavioral: Positive for sleep disturbance  Negative for behavioral problems and decreased concentration             Historical Information   Past Medical History:   Diagnosis Date    Allergic rhinitis     Cardiac arrhythmia     resolved 2016    GERD (gastroesophageal reflux disease)     Hyperkalemia     resolved 2016    Pulmonary embolism (HCC)     resolved 2016    Sinusitis     Supraventricular tachycardia ([facility] [address] )     esolved 2016     Past Surgical History:   Procedure Laterality Date     SECTION      COLONOSCOPY      ESOPHAGOGASTRODUODENOSCOPY      2014    TONSILLECTOMY      VEIN LIGATION      with stripping     Family History   Problem Relation Age of Onset   Laisha Espana Hypothyroidism Mother     Hypertension Mother    Laisha Espana Hypothyroidism Sister     Other Sister         benign polyps of the large intestine     Hypothyroidism Maternal Grandmother     Hypothyroidism Paternal Grandmother     Cancer Paternal Grandmother         Stomach Cancer    Esophageal cancer Father     Hypertension Father     Stroke Father     Cancer Father         Esophageal Cancer    Coronary artery disease Father     Asthma Brother      Social History     Socioeconomic History    Marital status: /Civil Union     Spouse name: Not on file    Number of children: Not on file    Years of education: Not on file    Highest education level: Not on file   Occupational History    Not on file   Tobacco Use    Smoking status: Former Smoker     Packs/day: 1 00     Years: 4 00     Pack years: 4 00     Types: Cigarettes    Smokeless tobacco: Never Used   Vaping Use    Vaping Use: Never used   Substance and Sexual Activity    Alcohol use:  Yes     Alcohol/week: 2 0 standard drinks     Types: 2 Glasses of wine per week     Comment: social    Drug use: No    Sexual activity: Yes     Partners: Male     Birth control/protection: Male Sterilization   Other Topics Concern    Not on file   Social History Narrative    Engages in travel abroad      Social Determinants of Health     Financial Resource Strain: Not on file   Food Insecurity: Not on file   Transportation Needs: Not on file   Physical Activity: Not on file   Stress: Not on file   Social Connections: Not on file   Intimate Partner Violence: Not on file   Housing Stability: Not on file       Occupational History: RN    Meds/Allergies   Allergies   Allergen Reactions    Epinephrine Nausea Only and Vomiting    Penicillins     Phenergan [Promethazine]        Home medications:  Prior to Admission medications    Medication Sig Start Date End Date Taking? Authorizing Provider   ASPIRIN 81 PO Take by mouth   Yes Historical Provider, MD   Cholecalciferol 5000 units capsule Take by mouth   Yes Historical Provider, MD   DAILY MULTIPLE VITAMINS PO Take 1 tablet by mouth daily 5/16/14  Yes Historical Provider, MD   diclofenac sodium (VOLTAREN) 50 mg EC tablet Take 1 tablet (50 mg total) by mouth daily as needed (pain) 5/0/90  Yes Juan C Frazier DO   fexofenadine (ALLEGRA) 180 MG tablet Take 1 capsule by mouth daily   Yes Historical Provider, MD   fluticasone (FLONASE) 50 mcg/act nasal spray 2 sprays into each nostril daily 99/22/10  Yes Joshua Heaton DO   guaiFENesin (MUCINEX) 600 mg 12 hr tablet Mucinex   Yes Historical Provider, MD   pantoprazole (PROTONIX) 40 mg tablet Take 1 tablet (40 mg total) by mouth daily 8/4/99  Yes Juan C Frazier DO   TURMERIC PO Take 1 capsule by mouth daily   Yes Historical Provider, MD   estradiol (ESTRING) 2 MG vaginal ring Insert into vaginal for 3 months 5/2/22 9/13/22 Yes Historical Provider, MD       Vitals:   Blood pressure 130/70, pulse 71, temperature 99 °F (37 2 °C), temperature source Tympanic, resp  rate 16, height 5' 7 5" (1 715 m), weight 67 6 kg (149 lb), SpO2 99 % , RA, Body mass index is 22 99 kg/m²         Physical Exam  General: Pleasant, Awake alert and oriented x 3, conversant without conversational dyspnea, NAD, normal affect  HEENT:  PERRL, Sclera noninjected, nonicteric OU, Nares patent,  no craniofacial abnormalities, Mucous membranes, moist, no oral lesions, normal dentition, Mallampati class 4  NECK: Trachea midline, no accessory muscle use, no stridor, no cervical or supraclavicular adenopathy, JVP not elevated  CARDIAC: Reg, single s1/S2, no m/r/g  PULM: CTA bilaterally no wheezing, rhonchi or rales  ABD: Normoactive bowel sounds, soft nontender, nondistended, no rebound, no rigidity, no guarding  EXT: No cyanosis, no clubbing, no edema, normal capillary refill  NEURO: no focal neurologic deficits, AAOx3, moving all extremities appropriately    Labs: I have personally reviewed pertinent lab results    Lab Results   Component Value Date    WBC 6 9 09/03/2021    HGB 13 0 09/03/2021    HCT 39 1 09/03/2021    MCV 93 09/03/2021     09/03/2021      Lab Results   Component Value Date    GLUCOSE 87 01/19/2018    CALCIUM 9 4 12/07/2018     01/19/2018    K 3 9 09/03/2021    CO2 24 09/03/2021     09/03/2021    BUN 20 09/03/2021    CREATININE 0 74 09/03/2021     No results found for: IRON, TIBC, FERRITIN  Lab Results   Component Value Date    [id number] 415 04/21/2016       Sleep studies:  none                                       DO Margareth [facility] Sleep Physician

## 2022-09-14 NOTE — PROGRESS NOTES
Sleep Consultation   Khris Olivia 63 y o  female MRN: [id number]      Reason for consultation: FAN, RLS    Requesting physician: Dr Juan Pham    Assessment/Plan  63 y o  F with PMHx of hay fever, chronic sinusitis and GERD who comes in for evaluation of bruxism  1   Bruxism/sleep maintenance insomnia/daytime sleepiness  -  Mallampati class 4, brother recently diagnosed with FAN, Body mass index is 22 99 kg/m² ,    She is at risk for obstructive sleep apnea based on ST[employer] survey  -  Check a home study to assess for obstructive sleep apnea      -  I discussed in depth the diagnostic studies and treatment options involved with obstructive sleep apnea      -  I also discussed in depth the risk of leaving sleep apnea untreated including hypertension, heart failure, arrhythmia, MI and stroke  -  The patient is agreeable to undergo testing and treatment of obstructive sleep apnea  She understands that pitfalls she may encounter along the way and is willing to attempt CPAP treatment  2  Restless legs       -  Treat FAN as above       -  Check Iron studies, magnesium to ensure that is not a cause for PLMs       -  If daytime sleepiness persists after the treatment of FAN, will consider treatment with Neurontin, Pregabalin, Requip or Mirapex     3  Sleep maintenance insomnia - she wakes up several times through the night  -  We did discuss multiple treatment options  She may benefit from Doxepin for sleep  We will first evaluate FAN and RLS as above and then consider medication if needed  History of Present Illness   HPI:  Khris Olivia is a 63 y o  female with PMHx as below who comes in for evaluation of bruxism and frequent nocturnal awakenings  Her dentist was concerned that she may have sleep apnea and recommended an evaluation for sleep apnea  Mrs Stephanie Reaves does not snore but also mentioned her brother was recently diagnosed with FAN and does not snore either    She does admit to frequent awakenings through the night  She also notes significant RLS symptoms as well  She denies snoring, awakenings with gasping, witnessed apneas, morning headaches  She does occasionally note some daytime sleepiness and awakenings with dry mouth  she note symptoms of restless legs  she denies symptoms of cataplexy, sleep paralysis, hypnopompic or hypnagogic hallucinations  Sleep History:  she goes to bed at approximately 10-11 pm, will get to sleep in a few min, will get out of bed at 7 am   she will get up up to 3-4 times at night for unknown reaon  It will then take a few minutes to fall back asleep    she does not typically nap during the day  ROS:   Review of Systems   Constitutional: Positive for fatigue and unexpected weight change  HENT: Positive for congestion  Negative for ear pain, nosebleeds and sneezing  Eyes: Negative  Respiratory: Positive for apnea  Negative for shortness of breath and wheezing  Cardiovascular: Negative  Gastrointestinal: Positive for diarrhea and nausea  Endocrine: Positive for cold intolerance  Genitourinary: Negative  Musculoskeletal: Positive for arthralgias  Negative for back pain, myalgias and neck stiffness  Skin: Negative  Allergic/Immunologic: Negative  Neurological: Negative  Hematological: Negative  Psychiatric/Behavioral: Positive for sleep disturbance  Negative for behavioral problems and decreased concentration             Historical Information   Past Medical History:   Diagnosis Date    Allergic rhinitis     Cardiac arrhythmia     resolved 2016    GERD (gastroesophageal reflux disease)     Hyperkalemia     resolved 2016    Pulmonary embolism ([facility] [address] )     resolved 2016    Sinusitis     Supraventricular tachycardia ([facility] [address] )     esolved 2016     Past Surgical History:   Procedure Laterality Date     SECTION      COLONOSCOPY      ESOPHAGOGASTRODUODENOSCOPY      2014  TONSILLECTOMY      VEIN LIGATION      with stripping     Family History   Problem Relation Age of Onset   Ardeth Needs Hypothyroidism Mother     Hypertension Mother    Ardeth Needs Hypothyroidism Sister     Other Sister         benign polyps of the large intestine     Hypothyroidism Maternal Grandmother     Hypothyroidism Paternal Grandmother     Cancer Paternal Grandmother         Stomach Cancer    Esophageal cancer Father     Hypertension Father     Stroke Father     Cancer Father         Esophageal Cancer    Coronary artery disease Father     Asthma Brother      Social History     Socioeconomic History    Marital status: /Civil Union     Spouse name: Not on file    Number of children: Not on file    Years of education: Not on file    Highest education level: Not on file   Occupational History    Not on file   Tobacco Use    Smoking status: Former Smoker     Packs/day: 1 00     Years: 4 00     Pack years: 4 00     Types: Cigarettes    Smokeless tobacco: Never Used   Vaping Use    Vaping Use: Never used   Substance and Sexual Activity    Alcohol use:  Yes     Alcohol/week: 2 0 standard drinks     Types: 2 Glasses of wine per week     Comment: social    Drug use: No    Sexual activity: Yes     Partners: Male     Birth control/protection: Male Sterilization   Other Topics Concern    Not on file   Social History Narrative    Engages in travel abroad      Social Determinants of Health     Financial Resource Strain: Not on file   Food Insecurity: Not on file   Transportation Needs: Not on file   Physical Activity: Not on file   Stress: Not on file   Social Connections: Not on file   Intimate Partner Violence: Not on file   Housing Stability: Not on file       Occupational History: RN    Meds/Allergies   Allergies   Allergen Reactions    Epinephrine Nausea Only and Vomiting    Penicillins     Phenergan [Promethazine]        Home medications:  Prior to Admission medications    Medication Sig Start Date End Date Taking? Authorizing Provider   ASPIRIN 81 PO Take by mouth   Yes Historical Provider, MD   Cholecalciferol 5000 units capsule Take by mouth   Yes Historical Provider, MD   DAILY MULTIPLE VITAMINS PO Take 1 tablet by mouth daily 5/16/14  Yes Historical Provider, MD   diclofenac sodium (VOLTAREN) 50 mg EC tablet Take 1 tablet (50 mg total) by mouth daily as needed (pain) 5/7/09  Yes Inge Perez DO   fexofenadine (ALLEGRA) 180 MG tablet Take 1 capsule by mouth daily   Yes Historical Provider, MD   fluticasone (FLONASE) 50 mcg/act nasal spray 2 sprays into each nostril daily 73/76/69  Yes Joshua Heaton DO   guaiFENesin (MUCINEX) 600 mg 12 hr tablet Mucinex   Yes Historical Provider, MD   pantoprazole (PROTONIX) 40 mg tablet Take 1 tablet (40 mg total) by mouth daily 4/6/60  Yes Inge Perez DO   TURMERIC PO Take 1 capsule by mouth daily   Yes Historical Provider, MD   estradiol (ESTRING) 2 MG vaginal ring Insert into vaginal for 3 months 5/2/22 9/13/22 Yes Historical Provider, MD       Vitals:   Blood pressure 130/70, pulse 71, temperature 99 °F (37 2 °C), temperature source Tympanic, resp  rate 16, height 5' 7 5" (1 715 m), weight 67 6 kg (149 lb), SpO2 99 % , RA, Body mass index is 22 99 kg/m²         Physical Exam  General: Pleasant, Awake alert and oriented x 3, conversant without conversational dyspnea, NAD, normal affect  HEENT:  PERRL, Sclera noninjected, nonicteric OU, Nares patent,  no craniofacial abnormalities, Mucous membranes, moist, no oral lesions, normal dentition, Mallampati class 4  NECK: Trachea midline, no accessory muscle use, no stridor, no cervical or supraclavicular adenopathy, JVP not elevated  CARDIAC: Reg, single s1/S2, no m/r/g  PULM: CTA bilaterally no wheezing, rhonchi or rales  ABD: Normoactive bowel sounds, soft nontender, nondistended, no rebound, no rigidity, no guarding  EXT: No cyanosis, no clubbing, no edema, normal capillary refill  NEURO: no focal neurologic deficits, AAOx3, moving all extremities appropriately    Labs: I have personally reviewed pertinent lab results    Lab Results   Component Value Date    WBC 6 9 09/03/2021    HGB 13 0 09/03/2021    HCT 39 1 09/03/2021    MCV 93 09/03/2021     09/03/2021      Lab Results   Component Value Date    GLUCOSE 87 01/19/2018    CALCIUM 9 4 12/07/2018     01/19/2018    K 3 9 09/03/2021    CO2 24 09/03/2021     09/03/2021    BUN 20 09/03/2021    CREATININE 0 74 09/03/2021     No results found for: IRON, TIBC, FERRITIN  Lab Results   Component Value Date    [id number] 415 04/21/2016       Sleep studies:  none                                       DO Madel[facility] Sleep Physician

## 2022-09-27 ENCOUNTER — TELEPHONE (OUTPATIENT)
Dept: FAMILY MEDICINE CLINIC | Facility: CLINIC | Age: 62
End: 2022-09-27

## 2022-09-27 DIAGNOSIS — Z00.00 HEALTHCARE MAINTENANCE: Primary | ICD-10-CM

## 2022-09-27 DIAGNOSIS — E78.00 PURE HYPERCHOLESTEROLEMIA: ICD-10-CM

## 2022-09-27 NOTE — TELEPHONE ENCOUNTER
----- Message from Thierno Diehl sent at 9/27/2022 12:10 PM EDT -----  Regarding: Annual lab work  Hi Dr Aria Abbott,  My annual physical with you was moved due to my upcoming surgery on November 11th  (rotator cuff repair)  So my annual physical is now with Dr Garcia Knife  Can the office send me in the mail the blood work I usually get for my annual physical so I can get it before my appointment on October 25th?   Thanks,  Orin Bosworth

## 2022-10-19 LAB
ALBUMIN SERPL-MCNC: 4.8 G/DL (ref 3.8–4.9)
ALBUMIN/GLOB SERPL: 2.2 {RATIO} (ref 1.2–2.2)
ALP SERPL-CCNC: 88 IU/L (ref 44–121)
ALT SERPL-CCNC: 25 IU/L (ref 0–32)
AST SERPL-CCNC: 24 IU/L (ref 0–40)
BASOPHILS # BLD AUTO: 0.1 X10E3/UL (ref 0–0.2)
BASOPHILS NFR BLD AUTO: 1 %
BILIRUB SERPL-MCNC: 0.5 MG/DL (ref 0–1.2)
BUN SERPL-MCNC: 16 MG/DL (ref 8–27)
BUN/CREAT SERPL: 21 (ref 12–28)
CALCIUM SERPL-MCNC: 9.7 MG/DL (ref 8.7–10.3)
CHLORIDE SERPL-SCNC: 105 MMOL/L (ref 96–106)
CHOLEST SERPL-MCNC: 231 MG/DL (ref 100–199)
CO2 SERPL-SCNC: 23 MMOL/L (ref 20–29)
CREAT SERPL-MCNC: 0.78 MG/DL (ref 0.57–1)
EGFR: 87 ML/MIN/1.73
EOSINOPHIL # BLD AUTO: 0.1 X10E3/UL (ref 0–0.4)
EOSINOPHIL NFR BLD AUTO: 2 %
ERYTHROCYTE [DISTWIDTH] IN BLOOD BY AUTOMATED COUNT: 14.5 % (ref 11.7–15.4)
FERRITIN SERPL-MCNC: 12 NG/ML (ref 15–150)
GLOBULIN SER-MCNC: 2.2 G/DL (ref 1.5–4.5)
GLUCOSE SERPL-MCNC: 85 MG/DL (ref 70–99)
HCT VFR BLD AUTO: 38.6 % (ref 34–46.6)
HDLC SERPL-MCNC: 62 MG/DL
HGB BLD-MCNC: 12.3 G/DL (ref 11.1–15.9)
IMM GRANULOCYTES # BLD: 0 X10E3/UL (ref 0–0.1)
IMM GRANULOCYTES NFR BLD: 0 %
IRON SATN MFR SERPL: 31 % (ref 15–55)
IRON SERPL-MCNC: 121 UG/DL (ref 27–159)
LDLC SERPL CALC-MCNC: 152 MG/DL (ref 0–99)
LYMPHOCYTES # BLD AUTO: 1.9 X10E3/UL (ref 0.7–3.1)
LYMPHOCYTES NFR BLD AUTO: 29 %
MAGNESIUM SERPL-MCNC: 2.2 MG/DL (ref 1.6–2.3)
MCH RBC QN AUTO: 26.4 PG (ref 26.6–33)
MCHC RBC AUTO-ENTMCNC: 31.9 G/DL (ref 31.5–35.7)
MCV RBC AUTO: 83 FL (ref 79–97)
MONOCYTES # BLD AUTO: 0.6 X10E3/UL (ref 0.1–0.9)
MONOCYTES NFR BLD AUTO: 8 %
NEUTROPHILS # BLD AUTO: 3.9 X10E3/UL (ref 1.4–7)
NEUTROPHILS NFR BLD AUTO: 60 %
PLATELET # BLD AUTO: 381 X10E3/UL (ref 150–450)
POTASSIUM SERPL-SCNC: 4.3 MMOL/L (ref 3.5–5.2)
PROT SERPL-MCNC: 7 G/DL (ref 6–8.5)
RBC # BLD AUTO: 4.66 X10E6/UL (ref 3.77–5.28)
SL AMB VLDL CHOLESTEROL CALC: 17 MG/DL (ref 5–40)
SODIUM SERPL-SCNC: 142 MMOL/L (ref 134–144)
TIBC SERPL-MCNC: 396 UG/DL (ref 250–450)
TRIGL SERPL-MCNC: 99 MG/DL (ref 0–149)
TSH SERPL DL<=0.005 MIU/L-ACNC: 2.29 UIU/ML (ref 0.45–4.5)
UIBC SERPL-MCNC: 275 UG/DL (ref 131–425)
WBC # BLD AUTO: 6.6 X10E3/UL (ref 3.4–10.8)

## 2022-10-25 ENCOUNTER — OFFICE VISIT (OUTPATIENT)
Dept: FAMILY MEDICINE CLINIC | Facility: CLINIC | Age: 62
End: 2022-10-25
Payer: COMMERCIAL

## 2022-10-25 VITALS
SYSTOLIC BLOOD PRESSURE: 130 MMHG | HEIGHT: 68 IN | RESPIRATION RATE: 16 BRPM | OXYGEN SATURATION: 97 % | DIASTOLIC BLOOD PRESSURE: 82 MMHG | WEIGHT: 148 LBS | TEMPERATURE: 98.6 F | HEART RATE: 75 BPM | BODY MASS INDEX: 22.43 KG/M2

## 2022-10-25 DIAGNOSIS — Z00.00 ANNUAL PHYSICAL EXAM: Primary | ICD-10-CM

## 2022-10-25 DIAGNOSIS — Z01.818 PREOP EXAMINATION: ICD-10-CM

## 2022-10-25 DIAGNOSIS — M75.101 TEAR OF RIGHT ROTATOR CUFF, UNSPECIFIED TEAR EXTENT, UNSPECIFIED WHETHER TRAUMATIC: ICD-10-CM

## 2022-10-25 PROCEDURE — 99213 OFFICE O/P EST LOW 20 MIN: CPT | Performed by: FAMILY MEDICINE

## 2022-10-25 PROCEDURE — 93000 ELECTROCARDIOGRAM COMPLETE: CPT | Performed by: FAMILY MEDICINE

## 2022-10-25 PROCEDURE — 99396 PREV VISIT EST AGE 40-64: CPT | Performed by: FAMILY MEDICINE

## 2022-10-25 NOTE — PROGRESS NOTES
[address]  1960    Johnathan Mauricio is a 63 y o  female with Right rotator cuff injury who is planning to undergo Arthroscopy with repair under general by [id number] [address] (Dr Kelsea Evans) on 11/11/2022  The procedure is indicated for the following condition: Right rotator cuff tear with possible biceps tenodesis  Patient has not had complications with anesthesia in the past     ROS:   Chest pain: no   Shortness of breath: no  Shortness of breath with exertion: no  Orthopnea: no  Dizziness: no  Unexplained weight change: no    PMH:  CAD: no  HTN: no  CKD: no  DM: no on insulin  History of CVA: no  SVT Ablated 2011  PE post op  Not currently on Blood Thinners    She  reports that she has quit smoking  Her smoking use included cigarettes  She has a 4 00 pack-year smoking history  She has never used smokeless tobacco  She reports current alcohol use of about 2 0 standard drinks of alcohol per week  She reports that she does not use drugs  /82 (BP Location: Left arm, Patient Position: Sitting, Cuff Size: Adult)   Pulse 75   Temp 98 6 °F (37 °C) (Temporal)   Resp 16   Ht 5' 7 5" (1 715 m)   Wt 67 1 kg (148 lb)   SpO2 97%   BMI 22 84 kg/m²   Physical Exam  Vitals reviewed  Constitutional:       General: She is not in acute distress  Appearance: Normal appearance  She is well-developed  She is not ill-appearing or toxic-appearing  HENT:      Head: Normocephalic and atraumatic  Right Ear: Tympanic membrane, ear canal and external ear normal  There is no impacted cerumen  Left Ear: Tympanic membrane, ear canal and external ear normal  There is no impacted cerumen  Nose: Nose normal  No congestion or rhinorrhea  Mouth/Throat:      Mouth: Mucous membranes are moist       Pharynx: Oropharynx is clear  No oropharyngeal exudate  Eyes:      General: No scleral icterus  Right eye: No discharge  Left eye: No discharge        Extraocular Movements: Extraocular movements intact  Conjunctiva/sclera: Conjunctivae normal       Pupils: Pupils are equal, round, and reactive to light  Cardiovascular:      Rate and Rhythm: Normal rate and regular rhythm  Pulses: Normal pulses  Heart sounds: Normal heart sounds  No murmur heard  Pulmonary:      Effort: Pulmonary effort is normal  No respiratory distress  Breath sounds: Normal breath sounds  Abdominal:      General: Abdomen is flat  There is no distension  Palpations: Abdomen is soft  Tenderness: There is no abdominal tenderness  Musculoskeletal:         General: No tenderness  Normal range of motion  Cervical back: Neck supple  Skin:     General: Skin is warm and dry  Findings: No rash  Neurological:      General: No focal deficit present  Mental Status: She is alert  Motor: No weakness        Gait: Gait normal    Psychiatric:         Mood and Affect: Mood normal          Behavior: Behavior normal          Revised Cardiac Risk Index (RCRI) for Pre-Operative Risk   (estimates risk of cardiac complications after noncardiac surgery)    High-risk surgery: No 0 / Yes +1  Intraperitoneal, intrathoracic, suprainguinal vascular  History of ischemic heart disease: No 0 / Yes +1  Hx of MI, (+) exercise test, current chest pain considered due to myocardial ischemia, use of nitrate therapy or ECG with pathological Q waves)  History of CHF: No 0 / Yes +1  Pulmonary edema, B/L rales or S3 gallop; RASHEED, orthopnea, PND, CXR showing pulmonary vascular redistribution)  History of cerebrovascular disease: No 0 / Yes +1  Prior TIA or stroke  Pre-operative treatment with insulin: No 0 / Yes +1  Pre-operative creatinine >2 mg/dL: No 0 / Yes +1    RCRI Scorin points: Class I Risk, 3 9% 30-day risk of death, MI, or cardiac arrest  1 point: Class II Risk, 6 0% 30-day risk of death, MI, or cardiac arrest  2 points: Class III Risk, 10 1% 30-day risk of death, MI, or cardiac arrest  3 points: Class IV Risk, 15% 30-day risk of death, MI, or cardiac arrest  4 points: Class IV Risk, 15% 30-day risk of death, MI, or cardiac arrest  5 points: Class IV Risk, 15% 30-day risk of death, MI, or cardiac arrest  6 points: Class IV Risk, 15% 30-day risk of death, MI, or cardiac arrest    Lab Results   Component Value Date    CREATININE 0 78 10/18/2022       Rosey Councilman was seen today for pre-op exam     Diagnoses and all orders for this visit:    Annual physical exam    Preop examination    Tear of right rotator cuff, unspecified tear extent, unspecified whether traumatic      Recommendations:  Jolene Handy is undergoing an elective Moderate Risk surgery  She is RCRI class I risk (0 points) with 3 9% 30-day risk of death, MI, or cardiac arrest  She may proceed with surgery as planned, PT/PTT not available on our end, patient did show on her phone that labs were done and are WNL  Pre-operative form completed and faxed today to office as requested      Loretto Sicard , MD

## 2022-10-25 NOTE — PROGRESS NOTES
[facility]    NAME: Jacquelyn Don  AGE: 63 y o  SEX: female  : 1960     DATE: 10/25/2022     Assessment and Plan:     Problem List Items Addressed This Visit        Musculoskeletal and Integument    Tear of right rotator cuff      Other Visit Diagnoses     Annual physical exam    -  Primary    Preop examination              Immunizations and preventive care screenings were discussed with patient today  Appropriate education was printed on patient's after visit summary  Counseling:  Dental Health: discussed importance of regular tooth brushing, flossing, and dental visits  Exercise: the importance of regular exercise/physical activity was discussed  Recommend exercise 3-5 times per week for at least 30 minutes  Depression Screening and Follow-up Plan: Patient was screened for depression during today's encounter  They screened negative with a PHQ-2 score of 0  Return in about 6 months (around 2023)  Chief Complaint:     Chief Complaint   Patient presents with   • Pre-op Exam     pre op clearance shoulder surgery & EKG & patient requesting annual physical      History of Present Illness:     Adult Annual Physical   Patient here for a comprehensive physical exam  The patient reports problems - Right rotator cuff tear  Diet and Physical Activity  Diet/Nutrition: well balanced diet  Exercise: walking  Depression Screening  PHQ-2/9 Depression Screening    Little interest or pleasure in doing things: 0 - not at all  Feeling down, depressed, or hopeless: 0 - not at all  PHQ-2 Score: 0  PHQ-2 Interpretation: Negative depression screen       General Health  Sleep: sleeps poorly and Due to shoulder pain  Hearing: normal - bilateral   Vision: no vision problems and wears glasses  Dental: regular dental visits, brushes teeth once daily and flosses teeth occasionally         /GYN Health  Patient is: postmenopausal  Last menstrual period:   Contraceptive method: N/A     Review of Systems:     Review of Systems   Constitutional: Negative for activity change, appetite change, fatigue and fever  HENT: Negative for congestion, rhinorrhea, sneezing and sore throat  Respiratory: Negative for cough, chest tightness, shortness of breath and wheezing  Cardiovascular: Negative for chest pain and palpitations  Gastrointestinal: Negative for abdominal distention, abdominal pain, constipation, diarrhea, nausea and vomiting  Musculoskeletal: Positive for arthralgias and myalgias  Negative for back pain and joint swelling  Skin: Negative for rash  Neurological: Negative for dizziness, weakness, numbness and headaches  Psychiatric/Behavioral: Positive for sleep disturbance  All other systems reviewed and are negative  Past Medical History:     Past Medical History:   Diagnosis Date   • Allergic rhinitis    • Cardiac arrhythmia     resolved 2016   • GERD (gastroesophageal reflux disease)    • Hyperkalemia     resolved 2016   • Pulmonary embolism ([facility] [address] )     resolved 2016   • Sinusitis    • [facility]     esolved 2016      Past Surgical History:     Past Surgical History:   Procedure Laterality Date   •  SECTION     • COLONOSCOPY     • ESOPHAGOGASTRODUODENOSCOPY      2014   • TONSILLECTOMY     • VEIN LIGATION      with stripping      Social History:     Social History     Socioeconomic History   • Marital status: /Civil Union     Spouse name: None   • Number of children: None   • Years of education: None   • Highest education level: None   Occupational History   • None   Tobacco Use   • Smoking status: Former Smoker     Packs/day: 1 00     Years: 4 00     Pack years: 4 00     Types: Cigarettes   • Smokeless tobacco: Never Used   Vaping Use   • Vaping Use: Never used   Substance and Sexual Activity   • Alcohol use:  Yes Alcohol/week: 2 0 standard drinks     Types: 2 Glasses of wine per week     Comment: social   • Drug use: No   • Sexual activity: Yes     Partners: Male     Birth control/protection: Male Sterilization   Other Topics Concern   • None   Social History Narrative    Engages in travel abroad      Social Determinants of Health     Financial Resource Strain: Not on file   Food Insecurity: Not on file   Transportation Needs: Not on file   Physical Activity: Not on file   Stress: Not on file   Social Connections: Not on file   Intimate Partner Violence: Not on file   Housing Stability: Not on file      Family History:     Family History   Problem Relation Age of Onset   • Hypothyroidism Mother    • Hypertension Mother    • Hypothyroidism Sister    • Other Sister         benign polyps of the large intestine    • Hypothyroidism Maternal Grandmother    • Hypothyroidism Paternal Grandmother    • Cancer Paternal Grandmother         Stomach Cancer   • Esophageal cancer Father    • Hypertension Father    • Stroke Father    • Cancer Father         Esophageal Cancer   • Coronary artery disease Father    • Asthma Brother       Current Medications:     Current Outpatient Medications   Medication Sig Dispense Refill   • ASPIRIN 81 PO Take by mouth     • Cholecalciferol 5000 units capsule Take by mouth     • DAILY MULTIPLE VITAMINS PO Take 1 tablet by mouth daily     • diclofenac sodium (VOLTAREN) 50 mg EC tablet Take 1 tablet (50 mg total) by mouth daily as needed (pain) 30 tablet 2   • fexofenadine (ALLEGRA) 180 MG tablet Take 1 capsule by mouth daily     • fluticasone (FLONASE) 50 mcg/act nasal spray 2 sprays into each nostril daily 16 g 0   • guaiFENesin (MUCINEX) 600 mg 12 hr tablet Mucinex     • pantoprazole (PROTONIX) 40 mg tablet Take 1 tablet (40 mg total) by mouth daily 90 tablet 1   • TURMERIC PO Take 1 capsule by mouth daily       No current facility-administered medications for this visit  Allergies:      Allergies Allergen Reactions   • Penicillins    • Phenergan [Promethazine]       Physical Exam:     /82 (BP Location: Left arm, Patient Position: Sitting, Cuff Size: Adult)   Pulse 75   Temp 98 6 °F (37 °C) (Temporal)   Resp 16   Ht 5' 7 5" (1 715 m)   Wt 67 1 kg (148 lb)   SpO2 97%   BMI 22 84 kg/m²     Physical Exam  Vitals and nursing note reviewed  Constitutional:       General: She is not in acute distress  Appearance: Normal appearance  She is well-developed  She is not ill-appearing or toxic-appearing  HENT:      Head: Normocephalic and atraumatic  Right Ear: Tympanic membrane, ear canal and external ear normal  There is no impacted cerumen  Left Ear: Tympanic membrane, ear canal and external ear normal  There is no impacted cerumen  Nose: Nose normal  No congestion or rhinorrhea  Mouth/Throat:      Mouth: Mucous membranes are moist       Pharynx: Oropharynx is clear  No oropharyngeal exudate  Eyes:      General: No scleral icterus  Right eye: No discharge  Left eye: No discharge  Extraocular Movements: Extraocular movements intact  Conjunctiva/sclera: Conjunctivae normal       Pupils: Pupils are equal, round, and reactive to light  Cardiovascular:      Rate and Rhythm: Normal rate and regular rhythm  Pulses: Normal pulses  Heart sounds: Normal heart sounds  No murmur heard  Pulmonary:      Effort: Pulmonary effort is normal  No respiratory distress  Breath sounds: Normal breath sounds  Abdominal:      General: Abdomen is flat  There is no distension  Palpations: Abdomen is soft  Tenderness: There is no abdominal tenderness  Musculoskeletal:      Cervical back: Neck supple  Comments: Pain of right shoulder   Skin:     General: Skin is warm and dry  Findings: No rash  Neurological:      General: No focal deficit present  Mental Status: She is alert  Motor: No weakness        Gait: Gait normal  Psychiatric:         Mood and Affect: Mood normal          Behavior: Behavior normal             Sera Dobson MD  [address]

## 2022-10-25 NOTE — PATIENT INSTRUCTIONS

## 2023-01-23 DIAGNOSIS — M47.816 SPONDYLOSIS OF LUMBAR SPINE: ICD-10-CM

## 2023-01-23 DIAGNOSIS — F19.90 EXCESSIVE USE OF NONSTEROIDAL ANTI-INFLAMMATORY DRUGS (NSAIDS): ICD-10-CM

## 2023-01-23 RX ORDER — PANTOPRAZOLE SODIUM 40 MG/1
40 TABLET, DELAYED RELEASE ORAL DAILY
Qty: 90 TABLET | Refills: 0 | Status: SHIPPED | OUTPATIENT
Start: 2023-01-23

## 2023-02-20 DIAGNOSIS — M47.816 SPONDYLOSIS OF LUMBAR SPINE: ICD-10-CM

## 2023-03-06 ENCOUNTER — TELEPHONE (OUTPATIENT)
Dept: PSYCHIATRY | Facility: CLINIC | Age: 63
End: 2023-03-06

## 2023-03-06 NOTE — TELEPHONE ENCOUNTER
Called patient regarding referral to be placed on proper wait list  LVM for patient to call intake dept ([phone]) back  Patients address is in [place]  Unsure if this was sent to us in error or patient address has not been updated

## 2023-03-13 ENCOUNTER — TELEPHONE (OUTPATIENT)
Dept: FAMILY MEDICINE CLINIC | Facility: CLINIC | Age: 63
End: 2023-03-13

## 2023-03-13 NOTE — TELEPHONE ENCOUNTER
Left voicemail message for patient to return call         Dhiraj Mooney DO   (Today,  8:57 AM)  Tell the patient I can prescribe this I just need to have the pharmacy confirmed

## 2023-03-13 NOTE — TELEPHONE ENCOUNTER
----- Message from Jesus Diehl sent at 3/13/2023  7:52 AM EDT -----  Regarding: Covid +  Contact: [phone]  Hi Dr Amanda Quiet,  I tested positive for Covid yesterday and am feeling pretty sick  Fever 100 5, cough, headache, sore throat, nasal drainage  I would like to request Paxlovid  I know when I first had Covid it worked great and I am feeling absolutely awful today    Thanks,  Karyn Dears

## 2023-03-14 ENCOUNTER — TELEPHONE (OUTPATIENT)
Dept: PSYCHIATRY | Facility: CLINIC | Age: 63
End: 2023-03-14

## 2023-03-14 DIAGNOSIS — U07.1 COVID-19 VIRUS INFECTION: Primary | ICD-10-CM

## 2023-03-14 RX ORDER — NIRMATRELVIR AND RITONAVIR 300-100 MG
3 KIT ORAL 2 TIMES DAILY
Qty: 30 TABLET | Refills: 0 | Status: SHIPPED | OUTPATIENT
Start: 2023-03-14 | End: 2023-03-19

## 2023-03-14 NOTE — TELEPHONE ENCOUNTER
A message was left for pt to return call to intake dept   Pt will be added to wait list at that time

## 2023-03-14 NOTE — TELEPHONE ENCOUNTER
Called patient made her aware that medication will be sent to the pharmacy  Dr Rebel Westfall is not in the office but he will try his best to do so        Jennifer doty

## 2023-04-04 ENCOUNTER — TELEPHONE (OUTPATIENT)
Dept: PSYCHIATRY | Facility: CLINIC | Age: 63
End: 2023-04-04

## 2023-04-27 DIAGNOSIS — G47.33 OSA (OBSTRUCTIVE SLEEP APNEA): Primary | ICD-10-CM

## 2023-05-09 ENCOUNTER — TELEPHONE (OUTPATIENT)
Dept: SLEEP CENTER | Facility: CLINIC | Age: 63
End: 2023-05-09

## 2023-05-09 DIAGNOSIS — M47.816 SPONDYLOSIS OF LUMBAR SPINE: ICD-10-CM

## 2023-05-09 NOTE — TELEPHONE ENCOUNTER
Sleep study shows mild FAN and Dr Janina Fisher ordered APAP  Patient previously seen by Dr Marion Fuller at [facility] needs to transition to sleep medicine  Spoke with the patient, she reports that she did read her results and inquired about a mandibular advancement device instead of CPAP      Dr Janina Fisher please advise

## 2023-05-10 NOTE — PROGRESS NOTES
I spoke to Mrs Banuelos about her sleep study and her iron studies  She will start oral iron and we also discussed alternative therapies such as oral appliances for her mild FAN  She will call to her dentist to see if she makes oral appliances  If they do not we will have to track a dentist who does the procedure  In addition, we discussed ramelteon  We will attempt that if she is still struggling in place of THC gummies to see if that provides improved sleep without effective memory

## 2023-05-16 ENCOUNTER — TELEPHONE (OUTPATIENT)
Dept: SLEEP CENTER | Facility: CLINIC | Age: 63
End: 2023-05-16

## 2023-05-16 NOTE — TELEPHONE ENCOUNTER
Spoke to the patient she found a dentist  Dr Michelle Jensen please write the RX for the mandibular advancement device      Patient also scheduled follow up

## 2023-05-19 DIAGNOSIS — G47.33 OSA (OBSTRUCTIVE SLEEP APNEA): Primary | ICD-10-CM

## 2023-05-19 NOTE — TELEPHONE ENCOUNTER
RX for mandibular advancement device written   Left message for the patient that RX is written she can access via my chart and I am mailing her a copy

## 2023-07-10 DIAGNOSIS — M47.816 SPONDYLOSIS OF LUMBAR SPINE: ICD-10-CM

## 2023-07-13 DIAGNOSIS — G47.00 INSOMNIA, UNSPECIFIED TYPE: Primary | ICD-10-CM

## 2023-07-13 RX ORDER — RAMELTEON 8 MG/1
8 TABLET ORAL
Qty: 30 TABLET | Refills: 1 | Status: SHIPPED | OUTPATIENT
Start: 2023-07-13

## 2023-07-14 DIAGNOSIS — M47.816 SPONDYLOSIS OF LUMBAR SPINE: ICD-10-CM

## 2023-10-19 DIAGNOSIS — R79.0 LOW FERRITIN: ICD-10-CM

## 2023-10-19 DIAGNOSIS — E78.5 DYSLIPIDEMIA: Primary | ICD-10-CM

## 2023-10-19 DIAGNOSIS — Z13.1 SCREENING FOR DIABETES MELLITUS: ICD-10-CM

## 2023-11-03 LAB
ALBUMIN SERPL-MCNC: 4.2 G/DL (ref 3.9–4.9)
ALBUMIN/GLOB SERPL: 1.8 {RATIO} (ref 1.2–2.2)
ALP SERPL-CCNC: 85 IU/L (ref 44–121)
ALT SERPL-CCNC: 15 IU/L (ref 0–32)
AST SERPL-CCNC: 19 IU/L (ref 0–40)
BASOPHILS # BLD AUTO: 0.1 X10E3/UL (ref 0–0.2)
BASOPHILS NFR BLD AUTO: 1 %
BILIRUB SERPL-MCNC: 0.4 MG/DL (ref 0–1.2)
BUN SERPL-MCNC: 16 MG/DL (ref 8–27)
BUN/CREAT SERPL: 21 (ref 12–28)
CALCIUM SERPL-MCNC: 9.2 MG/DL (ref 8.7–10.3)
CHLORIDE SERPL-SCNC: 106 MMOL/L (ref 96–106)
CO2 SERPL-SCNC: 24 MMOL/L (ref 20–29)
CREAT SERPL-MCNC: 0.78 MG/DL (ref 0.57–1)
EGFR: 86 ML/MIN/1.73
EOSINOPHIL # BLD AUTO: 0.2 X10E3/UL (ref 0–0.4)
EOSINOPHIL NFR BLD AUTO: 3 %
ERYTHROCYTE [DISTWIDTH] IN BLOOD BY AUTOMATED COUNT: 12.5 % (ref 11.7–15.4)
GLOBULIN SER-MCNC: 2.4 G/DL (ref 1.5–4.5)
GLUCOSE SERPL-MCNC: 86 MG/DL (ref 70–99)
HCT VFR BLD AUTO: 38.3 % (ref 34–46.6)
HGB BLD-MCNC: 12.7 G/DL (ref 11.1–15.9)
IMM GRANULOCYTES # BLD: 0 X10E3/UL (ref 0–0.1)
IMM GRANULOCYTES NFR BLD: 1 %
IRON SATN MFR SERPL: 29 % (ref 15–55)
IRON SERPL-MCNC: 82 UG/DL (ref 27–139)
LDLC SERPL DIRECT ASSAY-MCNC: 113 MG/DL (ref 0–99)
LYMPHOCYTES # BLD AUTO: 2.3 X10E3/UL (ref 0.7–3.1)
LYMPHOCYTES NFR BLD AUTO: 26 %
MCH RBC QN AUTO: 29.3 PG (ref 26.6–33)
MCHC RBC AUTO-ENTMCNC: 33.2 G/DL (ref 31.5–35.7)
MCV RBC AUTO: 88 FL (ref 79–97)
MONOCYTES # BLD AUTO: 0.5 X10E3/UL (ref 0.1–0.9)
MONOCYTES NFR BLD AUTO: 5 %
NEUTROPHILS # BLD AUTO: 5.7 X10E3/UL (ref 1.4–7)
NEUTROPHILS NFR BLD AUTO: 64 %
PLATELET # BLD AUTO: 363 X10E3/UL (ref 150–450)
POTASSIUM SERPL-SCNC: 4.5 MMOL/L (ref 3.5–5.2)
PROT SERPL-MCNC: 6.6 G/DL (ref 6–8.5)
RBC # BLD AUTO: 4.34 X10E6/UL (ref 3.77–5.28)
SODIUM SERPL-SCNC: 141 MMOL/L (ref 134–144)
TIBC SERPL-MCNC: 283 UG/DL (ref 250–450)
UIBC SERPL-MCNC: 201 UG/DL (ref 118–369)
WBC # BLD AUTO: 8.8 X10E3/UL (ref 3.4–10.8)

## 2023-11-08 LAB
ALBUMIN SERPL-MCNC: 4.6 G/DL (ref 3.9–4.9)
ALBUMIN/GLOB SERPL: 1.7 {RATIO} (ref 1.2–2.2)
ALP SERPL-CCNC: 83 IU/L (ref 44–121)
ALT SERPL-CCNC: 17 IU/L (ref 0–32)
AST SERPL-CCNC: 20 IU/L (ref 0–40)
BASOPHILS # BLD AUTO: 0.1 X10E3/UL (ref 0–0.2)
BASOPHILS NFR BLD AUTO: 1 %
BILIRUB SERPL-MCNC: 0.5 MG/DL (ref 0–1.2)
BUN SERPL-MCNC: 15 MG/DL (ref 8–27)
BUN/CREAT SERPL: 14 (ref 12–28)
CALCIUM SERPL-MCNC: 9.8 MG/DL (ref 8.7–10.3)
CHLORIDE SERPL-SCNC: 104 MMOL/L (ref 96–106)
CHOLEST SERPL-MCNC: 207 MG/DL (ref 100–199)
CO2 SERPL-SCNC: 23 MMOL/L (ref 20–29)
CREAT SERPL-MCNC: 1.04 MG/DL (ref 0.57–1)
EGFR: 61 ML/MIN/1.73
EOSINOPHIL # BLD AUTO: 0 X10E3/UL (ref 0–0.4)
EOSINOPHIL NFR BLD AUTO: 0 %
ERYTHROCYTE [DISTWIDTH] IN BLOOD BY AUTOMATED COUNT: 12.6 % (ref 11.7–15.4)
GLOBULIN SER-MCNC: 2.7 G/DL (ref 1.5–4.5)
GLUCOSE SERPL-MCNC: 86 MG/DL (ref 70–99)
HCT VFR BLD AUTO: 40.2 % (ref 34–46.6)
HDLC SERPL-MCNC: 75 MG/DL
HGB BLD-MCNC: 13.3 G/DL (ref 11.1–15.9)
IMM GRANULOCYTES # BLD: 0 X10E3/UL (ref 0–0.1)
IMM GRANULOCYTES NFR BLD: 0 %
IRON SATN MFR SERPL: 34 % (ref 15–55)
IRON SERPL-MCNC: 112 UG/DL (ref 27–139)
LDLC SERPL CALC-MCNC: 119 MG/DL (ref 0–99)
LDLC/HDLC SERPL: 1.6 RATIO (ref 0–3.2)
LYMPHOCYTES # BLD AUTO: 2.3 X10E3/UL (ref 0.7–3.1)
LYMPHOCYTES NFR BLD AUTO: 29 %
MCH RBC QN AUTO: 29.7 PG (ref 26.6–33)
MCHC RBC AUTO-ENTMCNC: 33.1 G/DL (ref 31.5–35.7)
MCV RBC AUTO: 90 FL (ref 79–97)
MONOCYTES # BLD AUTO: 0.5 X10E3/UL (ref 0.1–0.9)
MONOCYTES NFR BLD AUTO: 6 %
NEUTROPHILS # BLD AUTO: 4.9 X10E3/UL (ref 1.4–7)
NEUTROPHILS NFR BLD AUTO: 64 %
PLATELET # BLD AUTO: 408 X10E3/UL (ref 150–450)
POTASSIUM SERPL-SCNC: 4.6 MMOL/L (ref 3.5–5.2)
PROT SERPL-MCNC: 7.3 G/DL (ref 6–8.5)
RBC # BLD AUTO: 4.48 X10E6/UL (ref 3.77–5.28)
SL AMB VLDL CHOLESTEROL CALC: 13 MG/DL (ref 5–40)
SODIUM SERPL-SCNC: 140 MMOL/L (ref 134–144)
TIBC SERPL-MCNC: 331 UG/DL (ref 250–450)
TRIGL SERPL-MCNC: 72 MG/DL (ref 0–149)
UIBC SERPL-MCNC: 219 UG/DL (ref 118–369)
WBC # BLD AUTO: 7.8 X10E3/UL (ref 3.4–10.8)

## 2023-11-09 ENCOUNTER — OFFICE VISIT (OUTPATIENT)
Dept: FAMILY MEDICINE CLINIC | Facility: CLINIC | Age: 63
End: 2023-11-09
Payer: COMMERCIAL

## 2023-11-09 VITALS
HEART RATE: 68 BPM | WEIGHT: 137.4 LBS | OXYGEN SATURATION: 99 % | BODY MASS INDEX: 20.82 KG/M2 | DIASTOLIC BLOOD PRESSURE: 82 MMHG | SYSTOLIC BLOOD PRESSURE: 138 MMHG | HEIGHT: 68 IN

## 2023-11-09 DIAGNOSIS — R03.0 ELEVATED BLOOD PRESSURE READING: ICD-10-CM

## 2023-11-09 DIAGNOSIS — R19.7 DIARRHEA, UNSPECIFIED TYPE: ICD-10-CM

## 2023-11-09 DIAGNOSIS — Z00.00 ANNUAL PHYSICAL EXAM: Primary | ICD-10-CM

## 2023-11-09 DIAGNOSIS — R79.0 DECREASED FERRITIN: ICD-10-CM

## 2023-11-09 PROCEDURE — 99396 PREV VISIT EST AGE 40-64: CPT | Performed by: FAMILY MEDICINE

## 2023-11-09 RX ORDER — METHYLPREDNISOLONE 4 MG/1
TABLET ORAL
COMMUNITY
Start: 2023-11-05

## 2023-11-09 RX ORDER — ADHESIVE BANDAGE 3/4"
BANDAGE TOPICAL 2 TIMES DAILY
Qty: 1 EACH | Refills: 0 | Status: SHIPPED | OUTPATIENT
Start: 2023-11-09

## 2023-11-09 RX ORDER — ESTRADIOL 0.04 MG/D
FILM, EXTENDED RELEASE TRANSDERMAL
COMMUNITY

## 2023-11-09 NOTE — PROGRESS NOTES
[facility]    NAME: Idelia Bamberger  AGE: 66 y.o. SEX: female  : 1960     DATE: 2023     Assessment and Plan:     Annual physical exam completed today. Overall patient is doing well. Had colonoscopy in [place] last year, she will provide records. She does complain of some diarrhea, about 3-4 times per day. No blood in the stool. Was found to have decreased pancreatic enzymes through her GI doctor. She will follow-up with them and possibly start pancreatic enzyme replacement. Blood pressure 138/82, states that this is not common for her to have elevated pressures. We will order a blood pressure machine for her and she will call us back in 2 weeks with the numbers. She has a history of decreased ferritin level, we will recheck this. RTC in 1 year for annual physical    Problem List Items Addressed This Visit    None  Visit Diagnoses     Annual physical exam    -  Primary    Decreased ferritin        Relevant Orders    Ferritin    Elevated blood pressure reading        Relevant Medications    Blood Pressure Monitoring (Blood Pressure Cuff) MISC    Diarrhea, unspecified type              Immunizations and preventive care screenings were discussed with patient today. Appropriate education was printed on patient's after visit summary. Counseling:  Dental Health: discussed importance of regular tooth brushing, flossing, and dental visits. Exercise: the importance of regular exercise/physical activity was discussed. Recommend exercise 3-5 times per week for at least 30 minutes. Depression Screening and Follow-up Plan: Patient was screened for depression during today's encounter. They screened negative with a PHQ-2 score of 0.         Return in about 1 year (around 2024), or if symptoms worsen or fail to improve, for Annual physical.     Chief Complaint:     Chief Complaint   Patient presents with   • Physical Exam      History of Present Illness:     Adult Annual Physical   Patient here for a comprehensive physical exam. The patient reports no problems. Diet and Physical Activity  Diet/Nutrition:  intermittent fasting . Exercise:  bike riding 60-70 miles per week . Depression Screening  PHQ-2/9 Depression Screening    Little interest or pleasure in doing things: 0 - not at all  Feeling down, depressed, or hopeless: 0 - not at all  PHQ-2 Score: 0  PHQ-2 Interpretation: Negative depression screen       General Health  Sleep:  6-7 hours . Hearing: normal - bilateral.  Vision: wears glasses. Dental: regular dental visits. /GYN Health  Patient is: postmenopausal  Last menstrual period: Unknown. Possibly 2016  Contraceptive method:  N/A . Advanced Care Planning  Do you have an advanced directive? no  Do you have a durable medical power of ? no     Review of Systems:     Review of Systems   Constitutional:  Negative for activity change, appetite change, chills, fatigue and fever. HENT:  Negative for congestion, rhinorrhea, sneezing and sore throat. Eyes:  Negative for pain, discharge, redness and itching. Respiratory:  Negative for cough, chest tightness, shortness of breath and wheezing. Cardiovascular:  Negative for chest pain and palpitations. Gastrointestinal:  Positive for diarrhea. Negative for abdominal distention, abdominal pain, constipation, nausea and vomiting. Musculoskeletal:  Negative for arthralgias, back pain, joint swelling and myalgias. Skin:  Negative for rash. Neurological:  Negative for dizziness, weakness, numbness and headaches. Hematological:  Negative for adenopathy. Psychiatric/Behavioral:  Negative for dysphoric mood. All other systems reviewed and are negative.      Past Medical History:     Past Medical History:   Diagnosis Date   • Allergic    • Allergic rhinitis    • Arthritis    • Cardiac arrhythmia     resolved 04/21/2016   • GERD (gastroesophageal reflux disease)    • Hyperkalemia     resolved 2016   • Pulmonary embolism ([address])     resolved 2016   • Sinusitis    • Supraventricular tachycardia     esolved 2016      Past Surgical History:     Past Surgical History:   Procedure Laterality Date   •  SECTION     • COLONOSCOPY     • ESOPHAGOGASTRODUODENOSCOPY      2014   • TONSILLECTOMY     • VEIN LIGATION      with stripping      Social History:     Social History     Socioeconomic History   • Marital status: /Civil Union     Spouse name: None   • Number of children: None   • Years of education: None   • Highest education level: None   Occupational History   • None   Tobacco Use   • Smoking status: Former     Packs/day: 1.00     Years: 4.00     Total pack years: 4.00     Types: Cigarettes     Start date: 1980     Quit date: 1984     Years since quittin.4   • Smokeless tobacco: Never   Vaping Use   • Vaping Use: Never used   Substance and Sexual Activity   • Alcohol use:  Yes     Alcohol/week: 2.0 standard drinks of alcohol     Types: 2 Glasses of wine per week     Comment: social   • Drug use: No   • Sexual activity: Not Currently     Partners: Male     Birth control/protection: Male Sterilization   Other Topics Concern   • None   Social History Narrative    Engages in travel abroad      Social Determinants of Health     Financial Resource Strain: Not on file   Food Insecurity: Not on file   Transportation Needs: Not on file   Physical Activity: Not on file   Stress: Not on file   Social Connections: Not on file   Intimate Partner Violence: Not on file   Housing Stability: Not on file      Family History:     Family History   Problem Relation Age of Onset   • Hypothyroidism Mother    • Hypertension Mother    • Coronary artery disease Mother    • Hyperlipidemia Mother    • Thyroid disease Mother    • Arthritis Mother    • Hearing loss Mother    • Hypothyroidism Sister    • Other Sister benign polyps of the large intestine    • Alcohol abuse Sister    • Thyroid disease Sister    • Autoimmune disease Sister    • Hearing loss Sister    • Hypothyroidism Maternal Grandmother    • Dementia Maternal Grandmother    • Hypothyroidism Paternal Grandmother    • Cancer Paternal Grandmother         Stomach Cancer   • Thyroid disease Paternal Grandmother    • Arthritis Paternal Grandmother    • Esophageal cancer Father    • Hypertension Father    • Stroke Father    • Cancer Father         Esophageal Cancer   • Coronary artery disease Father    • Glaucoma Father    • Asthma Brother    • Hypertension Brother    • Hyperlipidemia Brother    • Hypertension Sister    • Hyperlipidemia Sister    • Arthritis Sister    • Autoimmune disease Son    • Prostate cancer Maternal Uncle    • Bipolar disorder Cousin       Current Medications:     Current Outpatient Medications   Medication Sig Dispense Refill   • ASPIRIN 81 PO Take by mouth     • Blood Pressure Monitoring (Blood Pressure Cuff) MISC Use 2 (two) times a day 1 each 0   • Cholecalciferol 5000 units capsule Take by mouth     • DAILY MULTIPLE VITAMINS PO Take 1 tablet by mouth daily     • estradiol (VIVELLE-DOT) 0.0375 MG/24HR APPLY 1 PATCH TOPICALLY TO THE SKIN 2 TIMES A WEEK     • fexofenadine (ALLEGRA) 180 MG tablet Take 1 capsule by mouth daily     • fluticasone (FLONASE) 50 mcg/act nasal spray 2 sprays into each nostril daily 16 g 0   • guaiFENesin (MUCINEX) 600 mg 12 hr tablet Mucinex     • methylPREDNISolone 4 MG tablet therapy pack Follow package directions     • TURMERIC PO Take 1 capsule by mouth daily     • Misc. Devices MISC Oral appliance for FAN  Diagnosis code - Mild FAN 1 each 0   • pantoprazole (PROTONIX) 40 mg tablet TAKE 1 TABLET(40 MG) BY MOUTH DAILY 90 tablet 0   • ramelteon (ROZEREM) 8 mg tablet Take 1 tablet (8 mg total) by mouth daily at bedtime 30 tablet 1     No current facility-administered medications for this visit.       Allergies: Allergies   Allergen Reactions   • Penicillins    • Phenergan [Promethazine]       Physical Exam:     /82 (BP Location: Left arm, Patient Position: Sitting, Cuff Size: Adult)   Pulse 68   Ht 5' 7.5" (1.715 m)   Wt 62.3 kg (137 lb 6.4 oz)   LMP  (Exact Date)   SpO2 99%   BMI 21.20 kg/m²     Physical Exam  Vitals reviewed. Constitutional:       General: She is not in acute distress. Appearance: Normal appearance. She is well-developed. She is not ill-appearing or toxic-appearing. HENT:      Head: Normocephalic and atraumatic. Right Ear: Tympanic membrane, ear canal and external ear normal. There is no impacted cerumen. Left Ear: Tympanic membrane, ear canal and external ear normal. There is no impacted cerumen. Nose: Nose normal. No congestion or rhinorrhea. Mouth/Throat:      Mouth: Mucous membranes are moist.      Pharynx: Oropharynx is clear. No oropharyngeal exudate. Eyes:      General: No scleral icterus. Right eye: No discharge. Left eye: No discharge. Extraocular Movements: Extraocular movements intact. Conjunctiva/sclera: Conjunctivae normal.      Pupils: Pupils are equal, round, and reactive to light. Cardiovascular:      Rate and Rhythm: Normal rate and regular rhythm. Pulses: Normal pulses. Heart sounds: Normal heart sounds. No murmur heard. Pulmonary:      Effort: Pulmonary effort is normal. No respiratory distress. Breath sounds: Normal breath sounds. Abdominal:      General: Abdomen is flat. Bowel sounds are normal. There is no distension. Palpations: Abdomen is soft. There is no mass. Tenderness: There is no abdominal tenderness. Hernia: No hernia is present. Musculoskeletal:         General: No swelling or tenderness. Normal range of motion. Cervical back: Normal range of motion. Skin:     General: Skin is warm and dry. Capillary Refill: Capillary refill takes less than 2 seconds. Findings: No rash. Neurological:      General: No focal deficit present. Mental Status: She is alert. Motor: No weakness.       Gait: Gait normal.   Psychiatric:         Mood and Affect: Mood normal.         Behavior: Behavior normal.          Otis Wasserman MD  [address] [facility]

## 2023-12-21 LAB — FERRITIN SERPL-MCNC: 34 NG/ML (ref 15–150)

## 2024-01-17 DIAGNOSIS — G47.00 INSOMNIA, UNSPECIFIED TYPE: ICD-10-CM

## 2024-01-17 RX ORDER — RAMELTEON 8 MG/1
8 TABLET ORAL
Qty: 30 TABLET | Refills: 0 | Status: CANCELLED | OUTPATIENT
Start: 2024-01-17

## 2024-01-19 NOTE — TELEPHONE ENCOUNTER
Patient was last seen by Dr. Colmenares in the [facility] office 9/13/2022 for treatment of FAN     She is requesting a refill on ramelton  Called the patient and left message for her to call back.   She needs to schedule follow up with provider

## 2024-01-22 DIAGNOSIS — G47.00 INSOMNIA, UNSPECIFIED TYPE: ICD-10-CM

## 2024-01-22 RX ORDER — RAMELTEON 8 MG/1
8 TABLET ORAL
Qty: 30 TABLET | Refills: 0 | Status: SHIPPED | OUTPATIENT
Start: 2024-01-22

## 2024-03-07 DIAGNOSIS — G47.00 INSOMNIA, UNSPECIFIED TYPE: ICD-10-CM

## 2024-03-08 RX ORDER — RAMELTEON 8 MG/1
TABLET ORAL
Qty: 30 TABLET | Refills: 0 | Status: SHIPPED | OUTPATIENT
Start: 2024-03-08 | End: 2024-03-11

## 2024-03-11 RX ORDER — RAMELTEON 8 MG/1
8 TABLET ORAL
Qty: 30 TABLET | Refills: 0 | Status: SHIPPED | OUTPATIENT
Start: 2024-03-11

## 2024-04-08 DIAGNOSIS — G47.00 INSOMNIA, UNSPECIFIED TYPE: ICD-10-CM

## 2024-04-09 RX ORDER — RAMELTEON 8 MG/1
8 TABLET ORAL
Qty: 30 TABLET | Refills: 0 | Status: SHIPPED | OUTPATIENT
Start: 2024-04-09

## 2024-05-14 DIAGNOSIS — G47.00 INSOMNIA, UNSPECIFIED TYPE: ICD-10-CM

## 2024-05-15 RX ORDER — RAMELTEON 8 MG/1
TABLET ORAL
Qty: 30 TABLET | Refills: 0 | Status: SHIPPED | OUTPATIENT
Start: 2024-05-15

## 2024-07-02 ENCOUNTER — PATIENT MESSAGE (OUTPATIENT)
Dept: FAMILY MEDICINE CLINIC | Facility: CLINIC | Age: 64
End: 2024-07-02

## 2024-07-19 DIAGNOSIS — Z00.6 ENCOUNTER FOR EXAMINATION FOR NORMAL COMPARISON OR CONTROL IN CLINICAL RESEARCH PROGRAM: ICD-10-CM

## 2024-08-20 ENCOUNTER — APPOINTMENT (OUTPATIENT)
Age: 64
End: 2024-08-20

## 2024-08-20 DIAGNOSIS — Z00.6 ENCOUNTER FOR EXAMINATION FOR NORMAL COMPARISON OR CONTROL IN CLINICAL RESEARCH PROGRAM: ICD-10-CM

## 2024-08-20 PROCEDURE — 36415 COLL VENOUS BLD VENIPUNCTURE: CPT

## 2024-09-07 LAB
APOB+LDLR+PCSK9 GENE MUT ANL BLD/T: NOT DETECTED
BRCA1+BRCA2 DEL+DUP + FULL MUT ANL BLD/T: NOT DETECTED
MLH1+MSH2+MSH6+PMS2 GN DEL+DUP+FUL M: NOT DETECTED

## 2025-03-28 ENCOUNTER — TELEPHONE (OUTPATIENT)
Dept: FAMILY MEDICINE CLINIC | Facility: CLINIC | Age: 65
End: 2025-03-28

## 2025-03-28 NOTE — TELEPHONE ENCOUNTER
Please remove Dr. Joshua Heaton as PCP. See below, patient has moved to Dr. Pritchard in [facility]

## 2025-03-29 NOTE — TELEPHONE ENCOUNTER
03/28/25 11:56 PM        The office's request has been received, reviewed, and the patient chart updated. The PCP has successfully been removed with a patient attribution note. This message will now be completed.        Thank you  Abdiaziz Crockett